# Patient Record
Sex: FEMALE | Race: WHITE | NOT HISPANIC OR LATINO
[De-identification: names, ages, dates, MRNs, and addresses within clinical notes are randomized per-mention and may not be internally consistent; named-entity substitution may affect disease eponyms.]

---

## 2020-10-12 ENCOUNTER — RESULT REVIEW (OUTPATIENT)
Age: 20
End: 2020-10-12

## 2020-11-17 ENCOUNTER — APPOINTMENT (OUTPATIENT)
Dept: ANTEPARTUM | Facility: CLINIC | Age: 20
End: 2020-11-17

## 2021-01-05 ENCOUNTER — APPOINTMENT (OUTPATIENT)
Dept: ANTEPARTUM | Facility: CLINIC | Age: 21
End: 2021-01-05
Payer: MEDICAID

## 2021-01-05 ENCOUNTER — ASOB RESULT (OUTPATIENT)
Age: 21
End: 2021-01-05

## 2021-01-05 PROCEDURE — 99072 ADDL SUPL MATRL&STAF TM PHE: CPT

## 2021-01-05 PROCEDURE — 76811 OB US DETAILED SNGL FETUS: CPT

## 2021-01-05 PROCEDURE — 76817 TRANSVAGINAL US OBSTETRIC: CPT

## 2021-06-08 ENCOUNTER — INPATIENT (INPATIENT)
Facility: HOSPITAL | Age: 21
LOS: 1 days | Discharge: HOME | End: 2021-06-10
Attending: SPECIALIST | Admitting: SPECIALIST

## 2021-06-08 ENCOUNTER — TRANSCRIPTION ENCOUNTER (OUTPATIENT)
Age: 21
End: 2021-06-08

## 2021-06-08 VITALS — TEMPERATURE: 99 F

## 2021-06-08 DIAGNOSIS — O26.899 OTHER SPECIFIED PREGNANCY RELATED CONDITIONS, UNSPECIFIED TRIMESTER: ICD-10-CM

## 2021-06-08 DIAGNOSIS — Z3A.00 WEEKS OF GESTATION OF PREGNANCY NOT SPECIFIED: ICD-10-CM

## 2021-06-08 LAB
BASOPHILS # BLD AUTO: 0.04 K/UL — SIGNIFICANT CHANGE UP (ref 0–0.2)
BASOPHILS NFR BLD AUTO: 0.3 % — SIGNIFICANT CHANGE UP (ref 0–2)
BLD GP AB SCN SERPL QL: NEGATIVE — SIGNIFICANT CHANGE UP
COVID-19 SPIKE DOMAIN AB INTERP: POSITIVE
COVID-19 SPIKE DOMAIN ANTIBODY RESULT: 94.1 U/ML — HIGH
EOSINOPHIL # BLD AUTO: 0.02 K/UL — SIGNIFICANT CHANGE UP (ref 0–0.5)
EOSINOPHIL NFR BLD AUTO: 0.1 % — SIGNIFICANT CHANGE UP (ref 0–6)
HCT VFR BLD CALC: 35.5 % — SIGNIFICANT CHANGE UP (ref 34.5–45)
HGB BLD-MCNC: 12 G/DL — SIGNIFICANT CHANGE UP (ref 11.5–15.5)
IANC: 12.07 K/UL — HIGH (ref 1.5–8.5)
IMM GRANULOCYTES NFR BLD AUTO: 0.5 % — SIGNIFICANT CHANGE UP (ref 0–1.5)
LYMPHOCYTES # BLD AUTO: 1.1 K/UL — SIGNIFICANT CHANGE UP (ref 1–3.3)
LYMPHOCYTES # BLD AUTO: 7.8 % — LOW (ref 13–44)
MCHC RBC-ENTMCNC: 30.6 PG — SIGNIFICANT CHANGE UP (ref 27–34)
MCHC RBC-ENTMCNC: 33.8 GM/DL — SIGNIFICANT CHANGE UP (ref 32–36)
MCV RBC AUTO: 90.6 FL — SIGNIFICANT CHANGE UP (ref 80–100)
MONOCYTES # BLD AUTO: 0.76 K/UL — SIGNIFICANT CHANGE UP (ref 0–0.9)
MONOCYTES NFR BLD AUTO: 5.4 % — SIGNIFICANT CHANGE UP (ref 2–14)
NEUTROPHILS # BLD AUTO: 12.07 K/UL — HIGH (ref 1.8–7.4)
NEUTROPHILS NFR BLD AUTO: 85.9 % — HIGH (ref 43–77)
NRBC # BLD: 0 /100 WBCS — SIGNIFICANT CHANGE UP
NRBC # FLD: 0 K/UL — SIGNIFICANT CHANGE UP
PLATELET # BLD AUTO: 254 K/UL — SIGNIFICANT CHANGE UP (ref 150–400)
RBC # BLD: 3.92 M/UL — SIGNIFICANT CHANGE UP (ref 3.8–5.2)
RBC # FLD: 13.7 % — SIGNIFICANT CHANGE UP (ref 10.3–14.5)
RH IG SCN BLD-IMP: POSITIVE — SIGNIFICANT CHANGE UP
RH IG SCN BLD-IMP: POSITIVE — SIGNIFICANT CHANGE UP
SARS-COV-2 IGG+IGM SERPL QL IA: 94.1 U/ML — HIGH
SARS-COV-2 IGG+IGM SERPL QL IA: POSITIVE
SARS-COV-2 RNA SPEC QL NAA+PROBE: SIGNIFICANT CHANGE UP
T PALLIDUM AB TITR SER: NEGATIVE — SIGNIFICANT CHANGE UP
WBC # BLD: 14.06 K/UL — HIGH (ref 3.8–10.5)
WBC # FLD AUTO: 14.06 K/UL — HIGH (ref 3.8–10.5)

## 2021-06-08 RX ORDER — DIBUCAINE 1 %
1 OINTMENT (GRAM) RECTAL EVERY 6 HOURS
Refills: 0 | Status: DISCONTINUED | OUTPATIENT
Start: 2021-06-08 | End: 2021-06-10

## 2021-06-08 RX ORDER — OXYTOCIN 10 UNIT/ML
333.33 VIAL (ML) INJECTION
Qty: 20 | Refills: 0 | Status: DISCONTINUED | OUTPATIENT
Start: 2021-06-08 | End: 2021-06-09

## 2021-06-08 RX ORDER — SODIUM CHLORIDE 9 MG/ML
300 INJECTION INTRAMUSCULAR; INTRAVENOUS; SUBCUTANEOUS ONCE
Refills: 0 | Status: COMPLETED | OUTPATIENT
Start: 2021-06-08 | End: 2021-06-08

## 2021-06-08 RX ORDER — SODIUM CHLORIDE 9 MG/ML
1000 INJECTION, SOLUTION INTRAVENOUS
Refills: 0 | Status: DISCONTINUED | OUTPATIENT
Start: 2021-06-08 | End: 2021-06-08

## 2021-06-08 RX ORDER — SIMETHICONE 80 MG/1
80 TABLET, CHEWABLE ORAL EVERY 4 HOURS
Refills: 0 | Status: DISCONTINUED | OUTPATIENT
Start: 2021-06-08 | End: 2021-06-10

## 2021-06-08 RX ORDER — AMPICILLIN TRIHYDRATE 250 MG
2 CAPSULE ORAL ONCE
Refills: 0 | Status: COMPLETED | OUTPATIENT
Start: 2021-06-08 | End: 2021-06-08

## 2021-06-08 RX ORDER — OXYCODONE HYDROCHLORIDE 5 MG/1
5 TABLET ORAL
Refills: 0 | Status: DISCONTINUED | OUTPATIENT
Start: 2021-06-08 | End: 2021-06-10

## 2021-06-08 RX ORDER — OXYCODONE HYDROCHLORIDE 5 MG/1
5 TABLET ORAL ONCE
Refills: 0 | Status: DISCONTINUED | OUTPATIENT
Start: 2021-06-08 | End: 2021-06-10

## 2021-06-08 RX ORDER — CITRIC ACID/SODIUM CITRATE 300-500 MG
15 SOLUTION, ORAL ORAL EVERY 6 HOURS
Refills: 0 | Status: DISCONTINUED | OUTPATIENT
Start: 2021-06-08 | End: 2021-06-08

## 2021-06-08 RX ORDER — ACETAMINOPHEN 500 MG
975 TABLET ORAL
Refills: 0 | Status: DISCONTINUED | OUTPATIENT
Start: 2021-06-08 | End: 2021-06-10

## 2021-06-08 RX ORDER — LANOLIN
1 OINTMENT (GRAM) TOPICAL EVERY 6 HOURS
Refills: 0 | Status: DISCONTINUED | OUTPATIENT
Start: 2021-06-08 | End: 2021-06-10

## 2021-06-08 RX ORDER — PRAMOXINE HYDROCHLORIDE 150 MG/15G
1 AEROSOL, FOAM RECTAL EVERY 4 HOURS
Refills: 0 | Status: DISCONTINUED | OUTPATIENT
Start: 2021-06-08 | End: 2021-06-10

## 2021-06-08 RX ORDER — TETANUS TOXOID, REDUCED DIPHTHERIA TOXOID AND ACELLULAR PERTUSSIS VACCINE, ADSORBED 5; 2.5; 8; 8; 2.5 [IU]/.5ML; [IU]/.5ML; UG/.5ML; UG/.5ML; UG/.5ML
0.5 SUSPENSION INTRAMUSCULAR ONCE
Refills: 0 | Status: DISCONTINUED | OUTPATIENT
Start: 2021-06-08 | End: 2021-06-10

## 2021-06-08 RX ORDER — SODIUM CHLORIDE 9 MG/ML
1000 INJECTION INTRAMUSCULAR; INTRAVENOUS; SUBCUTANEOUS
Refills: 0 | Status: DISCONTINUED | OUTPATIENT
Start: 2021-06-08 | End: 2021-06-09

## 2021-06-08 RX ORDER — MAGNESIUM HYDROXIDE 400 MG/1
30 TABLET, CHEWABLE ORAL
Refills: 0 | Status: DISCONTINUED | OUTPATIENT
Start: 2021-06-08 | End: 2021-06-10

## 2021-06-08 RX ORDER — DIPHENHYDRAMINE HCL 50 MG
25 CAPSULE ORAL EVERY 6 HOURS
Refills: 0 | Status: DISCONTINUED | OUTPATIENT
Start: 2021-06-08 | End: 2021-06-10

## 2021-06-08 RX ORDER — AMPICILLIN TRIHYDRATE 250 MG
1 CAPSULE ORAL EVERY 4 HOURS
Refills: 0 | Status: DISCONTINUED | OUTPATIENT
Start: 2021-06-08 | End: 2021-06-08

## 2021-06-08 RX ORDER — IBUPROFEN 200 MG
1 TABLET ORAL
Qty: 0 | Refills: 0 | DISCHARGE
Start: 2021-06-08

## 2021-06-08 RX ORDER — IBUPROFEN 200 MG
600 TABLET ORAL EVERY 6 HOURS
Refills: 0 | Status: COMPLETED | OUTPATIENT
Start: 2021-06-08 | End: 2022-05-07

## 2021-06-08 RX ORDER — ACETAMINOPHEN 500 MG
975 TABLET ORAL ONCE
Refills: 0 | Status: COMPLETED | OUTPATIENT
Start: 2021-06-08 | End: 2021-06-08

## 2021-06-08 RX ORDER — OXYTOCIN 10 UNIT/ML
2 VIAL (ML) INJECTION
Qty: 30 | Refills: 0 | Status: DISCONTINUED | OUTPATIENT
Start: 2021-06-08 | End: 2021-06-09

## 2021-06-08 RX ORDER — SODIUM CHLORIDE 9 MG/ML
3 INJECTION INTRAMUSCULAR; INTRAVENOUS; SUBCUTANEOUS EVERY 8 HOURS
Refills: 0 | Status: DISCONTINUED | OUTPATIENT
Start: 2021-06-08 | End: 2021-06-10

## 2021-06-08 RX ORDER — HYDROCORTISONE 1 %
1 OINTMENT (GRAM) TOPICAL EVERY 6 HOURS
Refills: 0 | Status: DISCONTINUED | OUTPATIENT
Start: 2021-06-08 | End: 2021-06-10

## 2021-06-08 RX ORDER — KETOROLAC TROMETHAMINE 30 MG/ML
30 SYRINGE (ML) INJECTION ONCE
Refills: 0 | Status: DISCONTINUED | OUTPATIENT
Start: 2021-06-08 | End: 2021-06-08

## 2021-06-08 RX ORDER — ACETAMINOPHEN 500 MG
3 TABLET ORAL
Qty: 0 | Refills: 0 | DISCHARGE
Start: 2021-06-08

## 2021-06-08 RX ORDER — BENZOCAINE 10 %
1 GEL (GRAM) MUCOUS MEMBRANE EVERY 6 HOURS
Refills: 0 | Status: DISCONTINUED | OUTPATIENT
Start: 2021-06-08 | End: 2021-06-10

## 2021-06-08 RX ORDER — AER TRAVELER 0.5 G/1
1 SOLUTION RECTAL; TOPICAL EVERY 4 HOURS
Refills: 0 | Status: DISCONTINUED | OUTPATIENT
Start: 2021-06-08 | End: 2021-06-10

## 2021-06-08 RX ORDER — FAMOTIDINE 10 MG/ML
20 INJECTION INTRAVENOUS ONCE
Refills: 0 | Status: COMPLETED | OUTPATIENT
Start: 2021-06-08 | End: 2021-06-08

## 2021-06-08 RX ADMIN — Medication 1000 MILLIUNIT(S)/MIN: at 21:57

## 2021-06-08 RX ADMIN — Medication 216 GRAM(S): at 09:41

## 2021-06-08 RX ADMIN — Medication 108 GRAM(S): at 13:45

## 2021-06-08 RX ADMIN — SODIUM CHLORIDE 600 MILLILITER(S): 9 INJECTION INTRAMUSCULAR; INTRAVENOUS; SUBCUTANEOUS at 13:16

## 2021-06-08 RX ADMIN — Medication 975 MILLIGRAM(S): at 17:36

## 2021-06-08 RX ADMIN — FAMOTIDINE 20 MILLIGRAM(S): 10 INJECTION INTRAVENOUS at 20:24

## 2021-06-08 RX ADMIN — Medication 2 MILLIUNIT(S)/MIN: at 12:32

## 2021-06-08 RX ADMIN — Medication 108 GRAM(S): at 17:38

## 2021-06-08 RX ADMIN — SODIUM CHLORIDE 125 MILLILITER(S): 9 INJECTION INTRAMUSCULAR; INTRAVENOUS; SUBCUTANEOUS at 13:33

## 2021-06-08 RX ADMIN — Medication 30 MILLIGRAM(S): at 22:55

## 2021-06-08 RX ADMIN — SODIUM CHLORIDE 125 MILLILITER(S): 9 INJECTION, SOLUTION INTRAVENOUS at 09:37

## 2021-06-08 NOTE — OB RN DELIVERY SUMMARY - NSSELHIDDEN_OBGYN_ALL_OB_FT
[NS_DeliveryAttending1_OBGYN_ALL_OB_FT:[id number]],[NS_DeliveryAssist1_OBGYN_ALL_OB_FT:[id number]=],[NS_DeliveryRN_OBGYN_ALL_OB_FT:[id number]=]

## 2021-06-08 NOTE — CHART NOTE - NSCHARTNOTEFT_GEN_A_CORE
PA Note    seen & examined for cont of management  comfortable    VS  T(C): 36.6 (06-08-21 @ 11:47)  HR: 106 (06-08-21 @ 13:13)  BP: 112/64 (06-08-21 @ 13:13)  RR: 16 (06-08-21 @ 11:47)  SpO2: 96% (06-08-21 @ 13:13)    /mod jordi/early & variable decels  [place] q 2-3min  4.5/80/-1 IUPC placed    cont efm/toco  pitocin held  amnioinfusion initiated  resuscitative measures in place   dw Dr Campos
PA Note    feeling rectal pressure    VS  T(C): 37.3 (21 @ 15:47)  HR: 79 (21 @ 16:23)  BP: 142/78 (21 @ 16:12)  RR: 16 (21 @ 15:47)  SpO2: 98% (21 @ 16:18)    140/mod jordi/+accels/intermittent early decels   [place] q 2-3min  7/100/0    cont efm/toco  cont current management  Dr Campos present at bedside   anticipated  ldevito pa
PA Note    patient seen & examined for AROM  comfortable with epidural     VS  T(C): 36.6 (06-08-21 @ 10:02)  HR: 66 (06-08-21 @ 11:48)  BP: 124/74 (06-08-21 @ 11:46)  RR: 18 (06-08-21 @ 10:00)  SpO2: 97% (06-08-21 @ 11:43)    /mod jordi/+accels/no decels  [place] q 2-5min  4/80/-2 AROM clear    cont efm/toco  plan for pitocin  dw Dr Kortney caban

## 2021-06-08 NOTE — OB PROVIDER H&P - HISTORY OF PRESENT ILLNESS
19yo  @41w0d  p/w +Ctx every 4-5m. -LOF, -Ctx, +FM. denies fever, chills, nausea, vomiting, diarrhea, headache, constipation, dizziness, syncope, chest pain, palpitations, shortness of breath, dysuria, urgency, frequency.  PNC: unc  GBS: POS  EFW: 3600  ObHx: denies  GynHx: denies  MedHx: denies  SrgHx: denies  PsychHx: denies  SocialHx: denies  AllergyHx: denies  RxHx: denies

## 2021-06-08 NOTE — OB NEONATOLOGY/PEDIATRICIAN DELIVERY SUMMARY - NSPEDSNEONOTESA_OBGYN_ALL_OB_FT
Baby is a 41.0 wk GA male born to a 21 y/o  mother via VAVD. Maternal history uncomplicated. Prenatal history uncomplicated. Maternal BT A+. PNL neg, NR, and immune. GBS positive, was treated with amp x3.  AROM at 11:46 on , clear fluids. Labor complicated by Cat II FHT. Delivery significant for vacuum assist for maternal exhaustion, nuchal x1, and terminal meconium. Baby born vigorous and crying spontaneously. WDSS. Apgars 8/9. At 6mol infant began having deep retractions, flaring and grunting. CPAP was initiated, with max settings of 6/30%. Was able to wean to FiO2 of 21%, put still required pressure support. Patient was admitted to NICU for continued resp support. EOS ***. Mom plans to breastfeed, consents to hepB and declines circ. Mother is COVID negative. Baby is a 41.0 wk GA male born to a 21 y/o  mother via VAVD. Maternal history uncomplicated. Prenatal history uncomplicated. Maternal BT A+. PNL neg, NR, and immune. GBS positive, was treated with amp x3.  AROM at 11:46 on , clear fluids. Labor complicated by Cat II FHT. Delivery significant for vacuum assist for maternal exhaustion, nuchal x1, and terminal meconium. Baby born vigorous and crying spontaneously. WDSS. Apgars 8/9. At 6mol infant began having deep retractions, flaring and grunting. CPAP was initiated, with max settings of 6/30%. Was able to wean to FiO2 of 21%, put still required pressure support. Patient was admitted to NICU for continued resp support. EOS 0.21. Mom plans to breastfeed, consents to hepB and declines circ. Mother is COVID negative.

## 2021-06-08 NOTE — OB PROVIDER H&P - NSHPPHYSICALEXAM_GEN_ALL_CORE
CV:RRR  Pulm: CTA bl  VE: 3/70/-3  FHT:  140   , mod jordi, + accels, - decels RUMA  TOCO:  q3m  BSUS: vertex

## 2021-06-08 NOTE — DISCHARGE NOTE OB - MEDICATION SUMMARY - MEDICATIONS TO TAKE
I will START or STAY ON the medications listed below when I get home from the hospital:    ibuprofen 600 mg oral tablet  -- 1 tab(s) by mouth every 6 hours  -- Indication: For Vaginal delivery    acetaminophen 325 mg oral tablet  -- 3 tab(s) by mouth   -- Indication: For Vaginal delivery

## 2021-06-08 NOTE — DISCHARGE NOTE OB - CARE PLAN
Principal Discharge DX:	Vaginal delivery  Goal:	Normal postpartum course  Assessment and plan of treatment:	Nothing in vagina  Secondary Diagnosis:	No pertinent past medical history

## 2021-06-08 NOTE — OB PROVIDER DELIVERY SUMMARY - NSPROVIDERDELIVERYNOTE_OBGYN_ALL_OB_FT
Delivered per vagina vacuum assisted over rml episiotomy liveborn male haney apgar 8/9.  Peds present for vacuum and tracing, Placenta spontaneously  and delivered intact.  Uterus firm. RML repaired in layers with chromic suture. Infant required CPAP->to NICU for respiratory concerns,

## 2021-06-08 NOTE — OB PROVIDER H&P - ASSESSMENT
21yo  @41w0d p/w ctx not in labor at this time. Pt to be induced for LTIOL  -admit to L&D  - for PO cytotec  - GBS POS for ampcillin  - EFW 3600  - Routine Labs  - FHT/TOCO  - Anesthesia Consult  - Anticipate      dw Dr.Donn Duong Traore PGY2

## 2021-06-08 NOTE — OB PROVIDER DELIVERY SUMMARY - NSSELHIDDEN_OBGYN_ALL_OB_FT
[NS_DeliveryAttending1_OBGYN_ALL_OB_FT:[id number]=],[NS_DeliveryAssist1_OBGYN_ALL_OB_FT:[id number]=]

## 2021-06-08 NOTE — DISCHARGE NOTE OB - CARE PROVIDER_API CALL
Que Sheets)  Obstetrics and Gynecology  [address] [place] [address]  Phone: [phone]  Fax: [phone]  Established Patient  Follow Up Time: Routine

## 2021-06-08 NOTE — OB RN DELIVERY SUMMARY - NS_SEPSISRSKCALC_OBGYN_ALL_OB_FT
EOS calculated successfully. EOS Risk Factor: 0.5/1000 live births ([facility] national incidence); GA=41w;Temp=100.04; ROM=9.733; GBS='Positive'; Antibiotics='GBS specific antibiotics > 2 hrs prior to birth'

## 2021-06-08 NOTE — OB RN TRIAGE NOTE - NS_TRIAGEINITIALRNASSESSMENT_OBGYN_ALL_OB_FT
maintain upright posture during/after eating for 30 mins/oral hygiene/check mouth frequently for oral residue/pocketing/crush medication (when feasible)/no straws/position upright (90 degrees)/small sips/bites lrao

## 2021-06-09 RX ORDER — SENNA PLUS 8.6 MG/1
1 TABLET ORAL
Refills: 0 | Status: DISCONTINUED | OUTPATIENT
Start: 2021-06-09 | End: 2021-06-10

## 2021-06-09 RX ORDER — IBUPROFEN 200 MG
600 TABLET ORAL EVERY 6 HOURS
Refills: 0 | Status: DISCONTINUED | OUTPATIENT
Start: 2021-06-09 | End: 2021-06-10

## 2021-06-09 RX ADMIN — Medication 600 MILLIGRAM(S): at 06:45

## 2021-06-09 RX ADMIN — Medication 600 MILLIGRAM(S): at 06:02

## 2021-06-09 RX ADMIN — Medication 975 MILLIGRAM(S): at 22:39

## 2021-06-09 RX ADMIN — Medication 975 MILLIGRAM(S): at 14:18

## 2021-06-09 RX ADMIN — Medication 975 MILLIGRAM(S): at 14:50

## 2021-06-09 RX ADMIN — MAGNESIUM HYDROXIDE 30 MILLILITER(S): 400 TABLET, CHEWABLE ORAL at 15:05

## 2021-06-09 RX ADMIN — Medication 600 MILLIGRAM(S): at 18:02

## 2021-06-09 RX ADMIN — Medication 600 MILLIGRAM(S): at 18:35

## 2021-06-09 RX ADMIN — Medication 975 MILLIGRAM(S): at 01:07

## 2021-06-09 RX ADMIN — Medication 975 MILLIGRAM(S): at 02:15

## 2021-06-09 RX ADMIN — Medication 975 MILLIGRAM(S): at 23:30

## 2021-06-09 RX ADMIN — Medication 975 MILLIGRAM(S): at 09:21

## 2021-06-09 RX ADMIN — Medication 975 MILLIGRAM(S): at 10:00

## 2021-06-09 RX ADMIN — SODIUM CHLORIDE 3 MILLILITER(S): 9 INJECTION INTRAMUSCULAR; INTRAVENOUS; SUBCUTANEOUS at 06:14

## 2021-06-09 NOTE — PROGRESS NOTE ADULT - SUBJECTIVE AND OBJECTIVE BOX
SUBJECTIVE:    Pain: Controlled    Complaints: None    MILESTONES:     Alert and oriented x 3  [x  ]  Out of bed /ambulating [  x]    Voiding [x  ]  Due to void [  ]    Tolerating a regular diet.    Infant feeding:   Breast [  ]   Bottle [  ]     Both [ X ]                         Feeding related issues or concerns:    Objective   T(C): 36.8 (21 @ 09:12), Max: 37.8 (21 @ 20:28)  HR: 72 (21 @ 09:12) (59 - 165)  BP: 118/83 (21 @ 09:12) (110/63 - 154/71)  RR: 18 (21 @ 09:12) (16 - 18)  SpO2: 99% (21 @ 09:12) (89% - 100%)  Wt(kg): --                        12.0   14.06 )-----------( 254      ( 2021 09:55 )             35.5         Blood Type: A Positive    RPR: Negative          MEDICATIONS  (STANDING):  acetaminophen   Tablet .. 975 milliGRAM(s) Oral <User Schedule>  diphtheria/tetanus/pertussis (acellular) Vaccine (ADAcel) 0.5 milliLiter(s) IntraMuscular once  ibuprofen  Tablet. 600 milliGRAM(s) Oral every 6 hours  prenatal multivitamin 1 Tablet(s) Oral daily  sodium chloride 0.9% lock flush 3 milliLiter(s) IV Push every 8 hours    MEDICATIONS  (PRN):  benzocaine 20%/menthol 0.5% Spray 1 Spray(s) Topical every 6 hours PRN for Perineal discomfort  dibucaine 1% Ointment 1 Application(s) Topical every 6 hours PRN Perineal discomfort  diphenhydrAMINE 25 milliGRAM(s) Oral every 6 hours PRN Pruritus  hydrocortisone 1% Cream 1 Application(s) Topical every 6 hours PRN Moderate Pain (4-6)  lanolin Ointment 1 Application(s) Topical every 6 hours PRN nipple soreness  magnesium hydroxide Suspension 30 milliLiter(s) Oral two times a day PRN Constipation  oxyCODONE    IR 5 milliGRAM(s) Oral every 3 hours PRN Moderate to Severe Pain (4-10)  oxyCODONE    IR 5 milliGRAM(s) Oral once PRN Moderate to Severe Pain (4-10)  pramoxine 1%/zinc 5% Cream 1 Application(s) Topical every 4 hours PRN Moderate Pain (4-6)  simethicone 80 milliGRAM(s) Chew every 4 hours PRN Gas  witch hazel Pads 1 Application(s) Topical every 4 hours PRN Perineal discomfort      ASSESSMENT:      20y      G 1   P 1001       PP Day # 1      Delivery:   [  ]             [   ]      EBL - 300    Assisted delivery  :    Vacuum   [ X ]   Forceps)  [  ]    Indication for assisted delivery: Tracing Abn [  ]     Maternal Exhaustion [  ]     Other [ X ]    Past medical history significant for HPI:  19yo  @41w0d  p/w +Ctx every 4-5m. -LOF, -Ctx, +FM. denies fever, chills, nausea, vomiting, diarrhea, headache, constipation, dizziness, syncope, chest pain, palpitations, shortness of breath, dysuria, urgency, frequency.  PNC: unc  GBS: POS  EFW: 3600  ObHx: denies  GynHx: denies  MedHx: denies  SrgHx: denies  PsychHx: denies  SocialHx: denies  AllergyHx: denies  RxHx: denies  (2021 08:57)      Current Issues:     Breasts: Soft [x  ]    Engorged [  ]  Nipples:  Abdomen: Soft [ x ]  Nontender [  ]  Nondistended [  ]   Distended [  ]    Vagina: Lochia   Mod [x  ]      Scant [  ]  Heavy  [  ]    Perineum:  Intact [  ]   Laceration   [  ]   Type of laceration [          ]    Episiotomy [ X ]    Type of episiotomy  [    RML    ]    Lower extremities: Edema  [  ] noted bilaterally .  Nontender Mc  [ X ]  Negative Fely's sign [  ] Positive pedal pulses [  ]    Other relevant physical exam findings;      PLAN:    Plan: Increase ambulation, analgesia PRN and pain medication  protocol standing oxycodone, ibuprofen and acetaminophen.    Diet: Continue regular diet    Labs:    Continue routine postpartum care.     Discharge Planning [x  ]    For  Discharge Today  [   ]  Consults :  Social Work [  ]     Lactation [x  ]    Other [      ]

## 2021-06-10 VITALS
OXYGEN SATURATION: 99 % | DIASTOLIC BLOOD PRESSURE: 80 MMHG | HEART RATE: 81 BPM | TEMPERATURE: 99 F | RESPIRATION RATE: 18 BRPM | SYSTOLIC BLOOD PRESSURE: 123 MMHG

## 2021-06-10 RX ADMIN — Medication 600 MILLIGRAM(S): at 04:00

## 2021-06-10 RX ADMIN — Medication 975 MILLIGRAM(S): at 10:10

## 2021-06-10 RX ADMIN — Medication 600 MILLIGRAM(S): at 03:04

## 2021-06-10 RX ADMIN — Medication 600 MILLIGRAM(S): at 14:10

## 2021-06-10 RX ADMIN — Medication 600 MILLIGRAM(S): at 13:38

## 2021-06-10 RX ADMIN — Medication 975 MILLIGRAM(S): at 09:39

## 2021-07-08 ENCOUNTER — TRANSCRIPTION ENCOUNTER (OUTPATIENT)
Age: 21
End: 2021-07-08

## 2021-10-06 ENCOUNTER — RESULT REVIEW (OUTPATIENT)
Age: 21
End: 2021-10-06

## 2022-08-16 PROBLEM — Z78.9 OTHER SPECIFIED HEALTH STATUS: Chronic | Status: ACTIVE | Noted: 2021-06-08

## 2022-08-29 PROBLEM — Z00.00 ENCOUNTER FOR PREVENTIVE HEALTH EXAMINATION: Status: ACTIVE | Noted: 2022-08-29

## 2022-08-31 ENCOUNTER — APPOINTMENT (OUTPATIENT)
Dept: SURGERY | Facility: CLINIC | Age: 22
End: 2022-08-31

## 2022-08-31 VITALS
RESPIRATION RATE: 16 BRPM | TEMPERATURE: 98.3 F | WEIGHT: 180 LBS | BODY MASS INDEX: 28.93 KG/M2 | HEART RATE: 101 BPM | SYSTOLIC BLOOD PRESSURE: 136 MMHG | HEIGHT: 66 IN | DIASTOLIC BLOOD PRESSURE: 85 MMHG | OXYGEN SATURATION: 100 %

## 2022-08-31 DIAGNOSIS — Z78.9 OTHER SPECIFIED HEALTH STATUS: ICD-10-CM

## 2022-08-31 DIAGNOSIS — K59.4 ANAL SPASM: ICD-10-CM

## 2022-08-31 DIAGNOSIS — K60.2 ANAL FISSURE, UNSPECIFIED: ICD-10-CM

## 2022-08-31 PROCEDURE — 99203 OFFICE O/P NEW LOW 30 MIN: CPT

## 2022-08-31 RX ORDER — NITROGLYCERIN 20 MG/G
2 OINTMENT TOPICAL
Qty: 1 | Refills: 3 | Status: ACTIVE | COMMUNITY
Start: 2022-08-31 | End: 1900-01-01

## 2022-08-31 NOTE — ASSESSMENT
[FreeTextEntry1] : I have seen and evaluated patient and I have corroborated all nursing input into this note.  Patient's current symptoms appear to be related to her anal fissure.  Her tag is likely a response to the fissure.  I recommended a trial of nitroglycerin and if that is unsuccessful then Botox injections can be performed.  The patient has an open draining wound in the right anterior position adjacent to her medial lateral episiotomy scar.  This may be an infected inclusion cyst.  It does not appear to be a fistula at this time.

## 2022-08-31 NOTE — CONSULT LETTER
Lab called for add on.   [Dear  ___] : Dear ~DIPIKA, [Courtesy Letter:] : I had the pleasure of seeing your patient, [unfilled], in my office today. [Please see my note below.] : Please see my note below. [Consult Closing:] : Thank you very much for allowing me to participate in the care of this patient.  If you have any questions, please do not hesitate to contact me. [Sincerely,] : Sincerely, [FreeTextEntry2] : Dr. Que Sheets [FreeTextEntry3] : Catalino Garcia M.D., ZANE.MOHAN., F.VERENA.S.BRADLEYRJeramyS.\[facility] Chief Colorectal Clinical Services, [facility] [DrJeramy  ___] : Dr. SOLORZANO

## 2022-08-31 NOTE — PHYSICAL EXAM
[Normal Breath Sounds] : Normal breath sounds [Normal Heart Sounds] : normal heart sounds [No Rash or Lesion] : No rash or lesion [Alert] : alert [Oriented to Person] : oriented to person [Oriented to Place] : oriented to place [Oriented to Time] : oriented to time [Calm] : calm [JVD] : no jugular venous distention  [de-identified] : WNL [de-identified] : WNL [de-identified] : ROM WNL [FreeTextEntry1] : Perianal inspection demonstrated a punctate wound in the right anterior position adjacent to a medial lateral episiotomy scar.  There was focal palpable induration.  There was no palpable cord extending to the anus consistent with a fistula.  There was sent anterior tag and associated fissure.  Local tenderness noted on digital exam.  Anoscopy deferred because of pain.

## 2022-08-31 NOTE — HISTORY OF PRESENT ILLNESS
[FreeTextEntry1] : Karla is a 22 year old female here for consultation of rectal bleeding. \par \par Pt never had a colonoscopy. \par \par Today pt reports feeling well, no pain. 4 formed BMs daily, straining, intermittent discomfort, BRB on toilet paper for the last month. Last episode of rectal bleeding 1-2 weeks ago. External tissue that swells and shrinks, itchy. No episodes of incontinence. Good appetite, no nausea, no vomiting. Denies fever and chills. Pt reports using preparation H and Sitz baths with partial relief.

## 2022-10-07 ENCOUNTER — RESULT REVIEW (OUTPATIENT)
Age: 22
End: 2022-10-07

## 2023-07-18 ENCOUNTER — APPOINTMENT (OUTPATIENT)
Dept: ANTEPARTUM | Facility: CLINIC | Age: 23
End: 2023-07-18
Payer: MEDICAID

## 2023-07-18 ENCOUNTER — ASOB RESULT (OUTPATIENT)
Age: 23
End: 2023-07-18

## 2023-07-18 PROCEDURE — 76805 OB US >/= 14 WKS SNGL FETUS: CPT

## 2023-12-03 ENCOUNTER — INPATIENT (INPATIENT)
Facility: HOSPITAL | Age: 23
LOS: 0 days | Discharge: HOME | End: 2023-12-04
Attending: SPECIALIST | Admitting: SPECIALIST

## 2023-12-03 ENCOUNTER — TRANSCRIPTION ENCOUNTER (OUTPATIENT)
Age: 23
End: 2023-12-03

## 2023-12-03 VITALS
SYSTOLIC BLOOD PRESSURE: 114 MMHG | TEMPERATURE: 98 F | HEART RATE: 86 BPM | RESPIRATION RATE: 16 BRPM | DIASTOLIC BLOOD PRESSURE: 74 MMHG

## 2023-12-03 DIAGNOSIS — O26.899 OTHER SPECIFIED PREGNANCY RELATED CONDITIONS, UNSPECIFIED TRIMESTER: ICD-10-CM

## 2023-12-03 LAB
ALBUMIN SERPL ELPH-MCNC: 3.5 G/DL — SIGNIFICANT CHANGE UP (ref 3.3–5)
ALBUMIN SERPL ELPH-MCNC: 3.5 G/DL — SIGNIFICANT CHANGE UP (ref 3.3–5)
ALP SERPL-CCNC: 167 U/L — HIGH (ref 40–120)
ALP SERPL-CCNC: 167 U/L — HIGH (ref 40–120)
ALT FLD-CCNC: 11 U/L — SIGNIFICANT CHANGE UP (ref 4–33)
ALT FLD-CCNC: 11 U/L — SIGNIFICANT CHANGE UP (ref 4–33)
ANION GAP SERPL CALC-SCNC: 13 MMOL/L — SIGNIFICANT CHANGE UP (ref 7–14)
ANION GAP SERPL CALC-SCNC: 13 MMOL/L — SIGNIFICANT CHANGE UP (ref 7–14)
APPEARANCE UR: ABNORMAL
APPEARANCE UR: ABNORMAL
APTT BLD: 26.3 SEC — SIGNIFICANT CHANGE UP (ref 24.5–35.6)
APTT BLD: 26.3 SEC — SIGNIFICANT CHANGE UP (ref 24.5–35.6)
AST SERPL-CCNC: 14 U/L — SIGNIFICANT CHANGE UP (ref 4–32)
AST SERPL-CCNC: 14 U/L — SIGNIFICANT CHANGE UP (ref 4–32)
BACTERIA # UR AUTO: ABNORMAL /HPF
BACTERIA # UR AUTO: ABNORMAL /HPF
BASOPHILS # BLD AUTO: 0.03 K/UL — SIGNIFICANT CHANGE UP (ref 0–0.2)
BASOPHILS # BLD AUTO: 0.03 K/UL — SIGNIFICANT CHANGE UP (ref 0–0.2)
BASOPHILS NFR BLD AUTO: 0.3 % — SIGNIFICANT CHANGE UP (ref 0–2)
BASOPHILS NFR BLD AUTO: 0.3 % — SIGNIFICANT CHANGE UP (ref 0–2)
BILIRUB SERPL-MCNC: <0.2 MG/DL — SIGNIFICANT CHANGE UP (ref 0.2–1.2)
BILIRUB SERPL-MCNC: <0.2 MG/DL — SIGNIFICANT CHANGE UP (ref 0.2–1.2)
BILIRUB UR-MCNC: NEGATIVE — SIGNIFICANT CHANGE UP
BILIRUB UR-MCNC: NEGATIVE — SIGNIFICANT CHANGE UP
BLD GP AB SCN SERPL QL: NEGATIVE — SIGNIFICANT CHANGE UP
BLD GP AB SCN SERPL QL: NEGATIVE — SIGNIFICANT CHANGE UP
BUN SERPL-MCNC: 5 MG/DL — LOW (ref 7–23)
BUN SERPL-MCNC: 5 MG/DL — LOW (ref 7–23)
CALCIUM SERPL-MCNC: 9 MG/DL — SIGNIFICANT CHANGE UP (ref 8.4–10.5)
CALCIUM SERPL-MCNC: 9 MG/DL — SIGNIFICANT CHANGE UP (ref 8.4–10.5)
CAST: 0 /LPF — SIGNIFICANT CHANGE UP (ref 0–4)
CAST: 0 /LPF — SIGNIFICANT CHANGE UP (ref 0–4)
CHLORIDE SERPL-SCNC: 106 MMOL/L — SIGNIFICANT CHANGE UP (ref 98–107)
CHLORIDE SERPL-SCNC: 106 MMOL/L — SIGNIFICANT CHANGE UP (ref 98–107)
CO2 SERPL-SCNC: 20 MMOL/L — LOW (ref 22–31)
CO2 SERPL-SCNC: 20 MMOL/L — LOW (ref 22–31)
COLOR SPEC: YELLOW — SIGNIFICANT CHANGE UP
COLOR SPEC: YELLOW — SIGNIFICANT CHANGE UP
CREAT ?TM UR-MCNC: 35 MG/DL — SIGNIFICANT CHANGE UP
CREAT ?TM UR-MCNC: 35 MG/DL — SIGNIFICANT CHANGE UP
CREAT SERPL-MCNC: 0.5 MG/DL — SIGNIFICANT CHANGE UP (ref 0.5–1.3)
CREAT SERPL-MCNC: 0.5 MG/DL — SIGNIFICANT CHANGE UP (ref 0.5–1.3)
DIFF PNL FLD: NEGATIVE — SIGNIFICANT CHANGE UP
DIFF PNL FLD: NEGATIVE — SIGNIFICANT CHANGE UP
EGFR: 133 ML/MIN/1.73M2 — SIGNIFICANT CHANGE UP
EGFR: 133 ML/MIN/1.73M2 — SIGNIFICANT CHANGE UP
EOSINOPHIL # BLD AUTO: 0.05 K/UL — SIGNIFICANT CHANGE UP (ref 0–0.5)
EOSINOPHIL # BLD AUTO: 0.05 K/UL — SIGNIFICANT CHANGE UP (ref 0–0.5)
EOSINOPHIL NFR BLD AUTO: 0.5 % — SIGNIFICANT CHANGE UP (ref 0–6)
EOSINOPHIL NFR BLD AUTO: 0.5 % — SIGNIFICANT CHANGE UP (ref 0–6)
FIBRINOGEN PPP-MCNC: 421 MG/DL — SIGNIFICANT CHANGE UP (ref 200–465)
FIBRINOGEN PPP-MCNC: 421 MG/DL — SIGNIFICANT CHANGE UP (ref 200–465)
GLUCOSE SERPL-MCNC: 77 MG/DL — SIGNIFICANT CHANGE UP (ref 70–99)
GLUCOSE SERPL-MCNC: 77 MG/DL — SIGNIFICANT CHANGE UP (ref 70–99)
GLUCOSE UR QL: NEGATIVE MG/DL — SIGNIFICANT CHANGE UP
GLUCOSE UR QL: NEGATIVE MG/DL — SIGNIFICANT CHANGE UP
HBV SURFACE AG SERPL QL IA: SIGNIFICANT CHANGE UP
HBV SURFACE AG SERPL QL IA: SIGNIFICANT CHANGE UP
HCT VFR BLD CALC: 36.1 % — SIGNIFICANT CHANGE UP (ref 34.5–45)
HCT VFR BLD CALC: 36.1 % — SIGNIFICANT CHANGE UP (ref 34.5–45)
HGB BLD-MCNC: 12.3 G/DL — SIGNIFICANT CHANGE UP (ref 11.5–15.5)
HGB BLD-MCNC: 12.3 G/DL — SIGNIFICANT CHANGE UP (ref 11.5–15.5)
IANC: 8.16 K/UL — HIGH (ref 1.8–7.4)
IANC: 8.16 K/UL — HIGH (ref 1.8–7.4)
IMM GRANULOCYTES NFR BLD AUTO: 0.6 % — SIGNIFICANT CHANGE UP (ref 0–0.9)
IMM GRANULOCYTES NFR BLD AUTO: 0.6 % — SIGNIFICANT CHANGE UP (ref 0–0.9)
INR BLD: <0.9 RATIO — SIGNIFICANT CHANGE UP (ref 0.85–1.18)
INR BLD: <0.9 RATIO — SIGNIFICANT CHANGE UP (ref 0.85–1.18)
KETONES UR-MCNC: NEGATIVE MG/DL — SIGNIFICANT CHANGE UP
KETONES UR-MCNC: NEGATIVE MG/DL — SIGNIFICANT CHANGE UP
LDH SERPL L TO P-CCNC: 147 U/L — SIGNIFICANT CHANGE UP (ref 135–225)
LDH SERPL L TO P-CCNC: 147 U/L — SIGNIFICANT CHANGE UP (ref 135–225)
LEUKOCYTE ESTERASE UR-ACNC: ABNORMAL
LEUKOCYTE ESTERASE UR-ACNC: ABNORMAL
LYMPHOCYTES # BLD AUTO: 1.49 K/UL — SIGNIFICANT CHANGE UP (ref 1–3.3)
LYMPHOCYTES # BLD AUTO: 1.49 K/UL — SIGNIFICANT CHANGE UP (ref 1–3.3)
LYMPHOCYTES # BLD AUTO: 14.1 % — SIGNIFICANT CHANGE UP (ref 13–44)
LYMPHOCYTES # BLD AUTO: 14.1 % — SIGNIFICANT CHANGE UP (ref 13–44)
MCHC RBC-ENTMCNC: 30.8 PG — SIGNIFICANT CHANGE UP (ref 27–34)
MCHC RBC-ENTMCNC: 30.8 PG — SIGNIFICANT CHANGE UP (ref 27–34)
MCHC RBC-ENTMCNC: 34.1 GM/DL — SIGNIFICANT CHANGE UP (ref 32–36)
MCHC RBC-ENTMCNC: 34.1 GM/DL — SIGNIFICANT CHANGE UP (ref 32–36)
MCV RBC AUTO: 90.3 FL — SIGNIFICANT CHANGE UP (ref 80–100)
MCV RBC AUTO: 90.3 FL — SIGNIFICANT CHANGE UP (ref 80–100)
MONOCYTES # BLD AUTO: 0.75 K/UL — SIGNIFICANT CHANGE UP (ref 0–0.9)
MONOCYTES # BLD AUTO: 0.75 K/UL — SIGNIFICANT CHANGE UP (ref 0–0.9)
MONOCYTES NFR BLD AUTO: 7.1 % — SIGNIFICANT CHANGE UP (ref 2–14)
MONOCYTES NFR BLD AUTO: 7.1 % — SIGNIFICANT CHANGE UP (ref 2–14)
NEUTROPHILS # BLD AUTO: 8.16 K/UL — HIGH (ref 1.8–7.4)
NEUTROPHILS # BLD AUTO: 8.16 K/UL — HIGH (ref 1.8–7.4)
NEUTROPHILS NFR BLD AUTO: 77.4 % — HIGH (ref 43–77)
NEUTROPHILS NFR BLD AUTO: 77.4 % — HIGH (ref 43–77)
NITRITE UR-MCNC: NEGATIVE — SIGNIFICANT CHANGE UP
NITRITE UR-MCNC: NEGATIVE — SIGNIFICANT CHANGE UP
NRBC # BLD: 0 /100 WBCS — SIGNIFICANT CHANGE UP (ref 0–0)
NRBC # BLD: 0 /100 WBCS — SIGNIFICANT CHANGE UP (ref 0–0)
NRBC # FLD: 0 K/UL — SIGNIFICANT CHANGE UP (ref 0–0)
NRBC # FLD: 0 K/UL — SIGNIFICANT CHANGE UP (ref 0–0)
PH UR: 8 — SIGNIFICANT CHANGE UP (ref 5–8)
PH UR: 8 — SIGNIFICANT CHANGE UP (ref 5–8)
PLATELET # BLD AUTO: 253 K/UL — SIGNIFICANT CHANGE UP (ref 150–400)
PLATELET # BLD AUTO: 253 K/UL — SIGNIFICANT CHANGE UP (ref 150–400)
POTASSIUM SERPL-MCNC: 3.8 MMOL/L — SIGNIFICANT CHANGE UP (ref 3.5–5.3)
POTASSIUM SERPL-MCNC: 3.8 MMOL/L — SIGNIFICANT CHANGE UP (ref 3.5–5.3)
POTASSIUM SERPL-SCNC: 3.8 MMOL/L — SIGNIFICANT CHANGE UP (ref 3.5–5.3)
POTASSIUM SERPL-SCNC: 3.8 MMOL/L — SIGNIFICANT CHANGE UP (ref 3.5–5.3)
PROT ?TM UR-MCNC: 6 MG/DL — SIGNIFICANT CHANGE UP
PROT ?TM UR-MCNC: 6 MG/DL — SIGNIFICANT CHANGE UP
PROT SERPL-MCNC: 6.4 G/DL — SIGNIFICANT CHANGE UP (ref 6–8.3)
PROT SERPL-MCNC: 6.4 G/DL — SIGNIFICANT CHANGE UP (ref 6–8.3)
PROT UR-MCNC: NEGATIVE MG/DL — SIGNIFICANT CHANGE UP
PROT UR-MCNC: NEGATIVE MG/DL — SIGNIFICANT CHANGE UP
PROT/CREAT UR-RTO: 0.2 RATIO — SIGNIFICANT CHANGE UP (ref 0–0.2)
PROT/CREAT UR-RTO: 0.2 RATIO — SIGNIFICANT CHANGE UP (ref 0–0.2)
PROTHROM AB SERPL-ACNC: 9.9 SEC — SIGNIFICANT CHANGE UP (ref 9.5–13)
PROTHROM AB SERPL-ACNC: 9.9 SEC — SIGNIFICANT CHANGE UP (ref 9.5–13)
RBC # BLD: 4 M/UL — SIGNIFICANT CHANGE UP (ref 3.8–5.2)
RBC # BLD: 4 M/UL — SIGNIFICANT CHANGE UP (ref 3.8–5.2)
RBC # FLD: 13.2 % — SIGNIFICANT CHANGE UP (ref 10.3–14.5)
RBC # FLD: 13.2 % — SIGNIFICANT CHANGE UP (ref 10.3–14.5)
RBC CASTS # UR COMP ASSIST: 0 /HPF — SIGNIFICANT CHANGE UP (ref 0–4)
RBC CASTS # UR COMP ASSIST: 0 /HPF — SIGNIFICANT CHANGE UP (ref 0–4)
RH IG SCN BLD-IMP: POSITIVE — SIGNIFICANT CHANGE UP
RH IG SCN BLD-IMP: POSITIVE — SIGNIFICANT CHANGE UP
SODIUM SERPL-SCNC: 139 MMOL/L — SIGNIFICANT CHANGE UP (ref 135–145)
SODIUM SERPL-SCNC: 139 MMOL/L — SIGNIFICANT CHANGE UP (ref 135–145)
SP GR SPEC: 1.01 — SIGNIFICANT CHANGE UP (ref 1–1.03)
SP GR SPEC: 1.01 — SIGNIFICANT CHANGE UP (ref 1–1.03)
SQUAMOUS # UR AUTO: 18 /HPF — HIGH (ref 0–5)
SQUAMOUS # UR AUTO: 18 /HPF — HIGH (ref 0–5)
URATE SERPL-MCNC: 3.5 MG/DL — SIGNIFICANT CHANGE UP (ref 2.5–7)
URATE SERPL-MCNC: 3.5 MG/DL — SIGNIFICANT CHANGE UP (ref 2.5–7)
UROBILINOGEN FLD QL: 0.2 MG/DL — SIGNIFICANT CHANGE UP (ref 0.2–1)
UROBILINOGEN FLD QL: 0.2 MG/DL — SIGNIFICANT CHANGE UP (ref 0.2–1)
WBC # BLD: 10.54 K/UL — HIGH (ref 3.8–10.5)
WBC # BLD: 10.54 K/UL — HIGH (ref 3.8–10.5)
WBC # FLD AUTO: 10.54 K/UL — HIGH (ref 3.8–10.5)
WBC # FLD AUTO: 10.54 K/UL — HIGH (ref 3.8–10.5)
WBC UR QL: 6 /HPF — HIGH (ref 0–5)
WBC UR QL: 6 /HPF — HIGH (ref 0–5)

## 2023-12-03 RX ORDER — IBUPROFEN 200 MG
1 TABLET ORAL
Qty: 0 | Refills: 0 | DISCHARGE
Start: 2023-12-03

## 2023-12-03 RX ORDER — OXYCODONE HYDROCHLORIDE 5 MG/1
5 TABLET ORAL ONCE
Refills: 0 | Status: DISCONTINUED | OUTPATIENT
Start: 2023-12-03 | End: 2023-12-04

## 2023-12-03 RX ORDER — CITRIC ACID/SODIUM CITRATE 300-500 MG
15 SOLUTION, ORAL ORAL EVERY 6 HOURS
Refills: 0 | Status: DISCONTINUED | OUTPATIENT
Start: 2023-12-03 | End: 2023-12-03

## 2023-12-03 RX ORDER — DIPHENHYDRAMINE HCL 50 MG
25 CAPSULE ORAL EVERY 6 HOURS
Refills: 0 | Status: DISCONTINUED | OUTPATIENT
Start: 2023-12-03 | End: 2023-12-04

## 2023-12-03 RX ORDER — SIMETHICONE 80 MG/1
80 TABLET, CHEWABLE ORAL EVERY 4 HOURS
Refills: 0 | Status: DISCONTINUED | OUTPATIENT
Start: 2023-12-03 | End: 2023-12-04

## 2023-12-03 RX ORDER — AER TRAVELER 0.5 G/1
1 SOLUTION RECTAL; TOPICAL EVERY 4 HOURS
Refills: 0 | Status: DISCONTINUED | OUTPATIENT
Start: 2023-12-03 | End: 2023-12-04

## 2023-12-03 RX ORDER — HYDROCORTISONE 1 %
1 OINTMENT (GRAM) TOPICAL EVERY 6 HOURS
Refills: 0 | Status: DISCONTINUED | OUTPATIENT
Start: 2023-12-03 | End: 2023-12-04

## 2023-12-03 RX ORDER — OXYTOCIN 10 UNIT/ML
333.33 VIAL (ML) INJECTION
Qty: 20 | Refills: 0 | Status: DISCONTINUED | OUTPATIENT
Start: 2023-12-03 | End: 2023-12-04

## 2023-12-03 RX ORDER — ACETAMINOPHEN 500 MG
3 TABLET ORAL
Qty: 0 | Refills: 0 | DISCHARGE
Start: 2023-12-03

## 2023-12-03 RX ORDER — OXYCODONE HYDROCHLORIDE 5 MG/1
5 TABLET ORAL
Refills: 0 | Status: DISCONTINUED | OUTPATIENT
Start: 2023-12-03 | End: 2023-12-04

## 2023-12-03 RX ORDER — KETOROLAC TROMETHAMINE 30 MG/ML
30 SYRINGE (ML) INJECTION ONCE
Refills: 0 | Status: DISCONTINUED | OUTPATIENT
Start: 2023-12-03 | End: 2023-12-03

## 2023-12-03 RX ORDER — LANOLIN
1 OINTMENT (GRAM) TOPICAL EVERY 6 HOURS
Refills: 0 | Status: DISCONTINUED | OUTPATIENT
Start: 2023-12-03 | End: 2023-12-04

## 2023-12-03 RX ORDER — DIBUCAINE 1 %
1 OINTMENT (GRAM) RECTAL EVERY 6 HOURS
Refills: 0 | Status: DISCONTINUED | OUTPATIENT
Start: 2023-12-03 | End: 2023-12-04

## 2023-12-03 RX ORDER — FAMOTIDINE 10 MG/ML
20 INJECTION INTRAVENOUS ONCE
Refills: 0 | Status: COMPLETED | OUTPATIENT
Start: 2023-12-03 | End: 2023-12-03

## 2023-12-03 RX ORDER — PRAMOXINE HYDROCHLORIDE 150 MG/15G
1 AEROSOL, FOAM RECTAL EVERY 4 HOURS
Refills: 0 | Status: DISCONTINUED | OUTPATIENT
Start: 2023-12-03 | End: 2023-12-04

## 2023-12-03 RX ORDER — SODIUM CHLORIDE 9 MG/ML
3 INJECTION INTRAMUSCULAR; INTRAVENOUS; SUBCUTANEOUS EVERY 8 HOURS
Refills: 0 | Status: DISCONTINUED | OUTPATIENT
Start: 2023-12-03 | End: 2023-12-04

## 2023-12-03 RX ORDER — MAGNESIUM HYDROXIDE 400 MG/1
30 TABLET, CHEWABLE ORAL
Refills: 0 | Status: DISCONTINUED | OUTPATIENT
Start: 2023-12-03 | End: 2023-12-04

## 2023-12-03 RX ORDER — BENZOCAINE 10 %
1 GEL (GRAM) MUCOUS MEMBRANE EVERY 6 HOURS
Refills: 0 | Status: DISCONTINUED | OUTPATIENT
Start: 2023-12-03 | End: 2023-12-04

## 2023-12-03 RX ORDER — IBUPROFEN 200 MG
600 TABLET ORAL EVERY 6 HOURS
Refills: 0 | Status: DISCONTINUED | OUTPATIENT
Start: 2023-12-03 | End: 2023-12-04

## 2023-12-03 RX ORDER — IBUPROFEN 200 MG
600 TABLET ORAL EVERY 6 HOURS
Refills: 0 | Status: COMPLETED | OUTPATIENT
Start: 2023-12-03 | End: 2024-10-31

## 2023-12-03 RX ORDER — INFLUENZA VIRUS VACCINE 15; 15; 15; 15 UG/.5ML; UG/.5ML; UG/.5ML; UG/.5ML
0.5 SUSPENSION INTRAMUSCULAR ONCE
Refills: 0 | Status: DISCONTINUED | OUTPATIENT
Start: 2023-12-03 | End: 2023-12-04

## 2023-12-03 RX ORDER — OXYTOCIN 10 UNIT/ML
4 VIAL (ML) INJECTION
Qty: 30 | Refills: 0 | Status: DISCONTINUED | OUTPATIENT
Start: 2023-12-03 | End: 2023-12-03

## 2023-12-03 RX ORDER — TETANUS TOXOID, REDUCED DIPHTHERIA TOXOID AND ACELLULAR PERTUSSIS VACCINE, ADSORBED 5; 2.5; 8; 8; 2.5 [IU]/.5ML; [IU]/.5ML; UG/.5ML; UG/.5ML; UG/.5ML
0.5 SUSPENSION INTRAMUSCULAR ONCE
Refills: 0 | Status: DISCONTINUED | OUTPATIENT
Start: 2023-12-03 | End: 2023-12-04

## 2023-12-03 RX ORDER — SODIUM CHLORIDE 9 MG/ML
1000 INJECTION, SOLUTION INTRAVENOUS
Refills: 0 | Status: DISCONTINUED | OUTPATIENT
Start: 2023-12-03 | End: 2023-12-03

## 2023-12-03 RX ORDER — ACETAMINOPHEN 500 MG
975 TABLET ORAL ONCE
Refills: 0 | Status: COMPLETED | OUTPATIENT
Start: 2023-12-03 | End: 2023-12-03

## 2023-12-03 RX ORDER — ACETAMINOPHEN 500 MG
975 TABLET ORAL
Refills: 0 | Status: DISCONTINUED | OUTPATIENT
Start: 2023-12-03 | End: 2023-12-04

## 2023-12-03 RX ORDER — CHLORHEXIDINE GLUCONATE 213 G/1000ML
1 SOLUTION TOPICAL DAILY
Refills: 0 | Status: DISCONTINUED | OUTPATIENT
Start: 2023-12-03 | End: 2023-12-03

## 2023-12-03 RX ADMIN — Medication 30 MILLIGRAM(S): at 15:57

## 2023-12-03 RX ADMIN — Medication 975 MILLIGRAM(S): at 15:39

## 2023-12-03 RX ADMIN — Medication 975 MILLIGRAM(S): at 21:35

## 2023-12-03 RX ADMIN — Medication 975 MILLIGRAM(S): at 20:42

## 2023-12-03 RX ADMIN — Medication 4 MILLIUNIT(S)/MIN: at 10:16

## 2023-12-03 RX ADMIN — Medication 600 MILLIGRAM(S): at 23:45

## 2023-12-03 RX ADMIN — SODIUM CHLORIDE 3 MILLILITER(S): 9 INJECTION INTRAMUSCULAR; INTRAVENOUS; SUBCUTANEOUS at 21:05

## 2023-12-03 RX ADMIN — Medication 975 MILLIGRAM(S): at 13:16

## 2023-12-03 RX ADMIN — FAMOTIDINE 20 MILLIGRAM(S): 10 INJECTION INTRAVENOUS at 10:17

## 2023-12-03 RX ADMIN — CHLORHEXIDINE GLUCONATE 1 APPLICATION(S): 213 SOLUTION TOPICAL at 10:27

## 2023-12-03 NOTE — OB PROVIDER H&P - HISTORY OF PRESENT ILLNESS
Ms. YANN GARCIA is a 23y  @ 40w2d presenting for postdates NST/BPP. Feels occasional ctx, mild. + FM. Denies lof, vb. GBS neg (23).    PNC: Denies prenatal issues or complications. Pt reports no hospitalizations, procedures, infections, or new diagnoses in pregnancy. Pt denies complications with blood pressure and/or blood sugar.

## 2023-12-03 NOTE — OB RN DELIVERY SUMMARY - NS_SEPSISRSKCALC_OBGYN_ALL_OB_FT
EOS calculated successfully. EOS Risk Factor: 0.5/1000 live births ([facility] national incidence); GA=40w2d; Temp=99.14; ROM=2.067; GBS='Negative'; Antibiotics='No antibiotics or any antibiotics < 2 hrs prior to birth'   EOS calculated successfully. EOS Risk Factor: 0.5/1000 live births ([facility] national incidence); GA=40w2d; Temp=99.14; ROM=2.067; GBS='Negative'; Antibiotics='No antibiotics or any antibiotics < 2 hrs prior to birth'

## 2023-12-03 NOTE — DISCHARGE NOTE OB - NSCORESITESY/N_GEN_A_CORE_RD
From: Zach Bernstein  To: Evaristo Anna  Sent: 7/17/2023 6:15 PM CDT  Subject: Health Form    Hi Dr Anna,    For my college financial aid to be disbursed for the class I am currently enrolled in; the [employer] has requested that this form be completed. It asks if you deem me healthy enough to function as of our last visit. Thanks in advance.    Zach Bernstein   
No

## 2023-12-03 NOTE — OB PROVIDER DELIVERY SUMMARY - NSSELHIDDEN_OBGYN_ALL_OB_FT
[NS_DeliveryAttending1_OBGYN_ALL_OB_FT:[id number]],[NS_DeliveryAssist1_OBGYN_ALL_OB_FT:[id number]] [NS_DeliveryAttending1_OBGYN_ALL_OB_FT:[id number]],[NS_DeliveryAssist1_OBGYN_ALL_OB_FT:[id number]=]

## 2023-12-03 NOTE — OB RN DELIVERY SUMMARY - AMNIOTIC FLUID ODOR, LABOR
Emiliano Slaomonolz states that she is just trying to be an advocate and feels that Margaux Darby would have less UTIs if she has a jones catheter. We discussed that she needs to meet certain criteria to have a jones placed. She said she just feels she is beyond help now with her UTIs due to her diabetes. She said she previously had a lot of chafing, but does not have that any more. I explained that I would update Dr. Ronaldo Navarrete and recommended scheduling a follow up with Dr. Ronaldo Navarrete to discuss concerns and options. normal

## 2023-12-03 NOTE — DISCHARGE NOTE OB - CARE PROVIDER_API CALL
Que Sheets.  Obstetrics and Gynecology  [address] [place] [address]  Phone: [phone]  Fax: [phone]  Follow Up Time: 1 month   Que Sheets.  Obstetrics and Gynecology  [address] [place] [address]  Phone: [phone]  Fax: [phone]  Follow Up Time: 1 month

## 2023-12-03 NOTE — PROVIDER CONTACT NOTE (OTHER) - ASSESSMENT
pt denies shortness of breath at present time while in bed. pt denied lightheadedness/ dizziness while standing

## 2023-12-03 NOTE — OB PROVIDER TRIAGE NOTE - NSHPPHYSICALEXAM_GEN_ALL_CORE
T(C): --  HR: --  BP: --  RR: --  SpO2: --  General: Female sitting comfortably   Head: Normocephalic. Atraumatic.   Eyes: No discharge, lids normal, conjunctiva normal  Lungs: No resp distress  Abdomen: Soft, nontender. Gravid.   TAUS:  Sono saved in ASOB.   NST:   Neuro: No facial asymmetry, no slurred speech, moves all 4 extremities  Mood: Alert and lucid, appropriate mood and affect

## 2023-12-03 NOTE — OB RN DELIVERY SUMMARY - NSSELHIDDEN_OBGYN_ALL_OB_FT
[NS_DeliveryAttending1_OBGYN_ALL_OB_FT:[id number]],[NS_DeliveryAssist1_OBGYN_ALL_OB_FT:[id number]],[NS_DeliveryRN_OBGYN_ALL_OB_FT:[id number]] [NS_DeliveryAttending1_OBGYN_ALL_OB_FT:[id number]],[NS_DeliveryAssist1_OBGYN_ALL_OB_FT:[id number]=],[NS_DeliveryRN_OBGYN_ALL_OB_FT:[id number]=]

## 2023-12-03 NOTE — CHART NOTE - NSCHARTNOTEFT_GEN_A_CORE
S: Patient seen and evaluated at bedside due to SOB and weakness. Patient reports feeling weak and mild dyspnea when ambulating to bathroom. Patient denies any SOB or weakness when lying in bed. Reports that she is tolerating PO intake. Otherwise denies any lightheadedness, dizziness, chest pain, abdominal pain, N/V.    O:  T(C): 36.7 (03 Dec 2023 18:30), Max: 37.3 (03 Dec 2023 12:15)  T(F): 98 (03 Dec 2023 18:30), Max: 99.14 (03 Dec 2023 12:15)  HR: 88 (03 Dec 2023 17:26) (62 - 144)  BP: 124/79 (03 Dec 2023 17:13) (97/60 - 139/92)  BP(mean): --  ABP: --  ABP(mean): --  RR: 18 (03 Dec 2023 18:30) (16 - 18)  SpO2: 99% (03 Dec 2023 18:30) (93% - 100%)    O2 Parameters below as of 03 Dec 2023 18:30  Patient On (Oxygen Delivery Method): room air               12.3   10.54 )-----------( 253      ( 12-03 @ 09:10 )             36.1     General: No acute distress  Cardiac: Regular rate, rhythm. Normal S1/S2.   Pulmonary: Lungs clear to auscultation bilaterally  Abdomen: Fundus firm, midline, below umbilicus. Appropriate lochia    A/P: Patient PPD#0 from , c/b 2nd degree laceration and . Patient with mild SOB with ambulation and feeling of fatigue. Patient hemodynamically stable, exam unremarkable. SpO2 99%. Patient passed orthostatic vital signs.  - Encourage PO hydration    JERRY Steve, PGY-1 S: Patient seen and evaluated at bedside due to SOB and weakness. Patient reports feeling weak and mild dyspnea when ambulating to bathroom. Patient denies any SOB or weakness when lying in bed. Reports that she is tolerating PO intake. Otherwise denies any lightheadedness, dizziness, chest pain, abdominal pain, N/V.    O:  T(C): 36.7 (03 Dec 2023 18:30), Max: 37.3 (03 Dec 2023 12:15)  T(F): 98 (03 Dec 2023 18:30), Max: 99.14 (03 Dec 2023 12:15)  HR: 88 (03 Dec 2023 17:26) (62 - 144)  BP: 124/79 (03 Dec 2023 17:13) (97/60 - 139/92)  BP(mean): --  ABP: --  ABP(mean): --  RR: 18 (03 Dec 2023 18:30) (16 - 18)  SpO2: 99% (03 Dec 2023 18:30) (93% - 100%)    O2 Parameters below as of 03 Dec 2023 18:30  Patient On (Oxygen Delivery Method): room air               12.3   10.54 )-----------( 253      ( 12-03 @ 09:10 )             36.1     General: No acute distress  Cardiac: Regular rate, rhythm. Normal S1/S2.   Pulmonary: Lungs clear to auscultation bilaterally  Abdomen: Fundus firm, midline, below umbilicus. Appropriate lochia    A/P: Patient PPD#0 from , c/b 2nd degree laceration and . Patient with mild SOB with ambulation and feeling of fatigue. Patient hemodynamically stable, exam unremarkable. SpO2 99%. Patient passed orthostatic vital signs.  - Encourage PO hydration    JRERY Steve, PGY-1

## 2023-12-03 NOTE — OB PROVIDER TRIAGE NOTE - NSOBPROVIDERNOTE_OBGYN_ALL_OB_FT
Ms. YANN GARCIA is a 23y  @ 40w2d presenting for NST/BPP. GBS neg (23).    PNC: Denies prenatal issues or complications. Pt reports no hospitalizations, procedures, infections, or new diagnoses in pregnancy. Pt denies complications with blood pressure and/or blood sugar. Ms. YANN GARCIA is a 23y  @ 40w2d w reassuring NST/BPP.    - 139/92, denies ha, visual disturbances, upper abdominal pain, hx of elevated bps     Plan  - Continue to monitor BP    Plan d/w Dr. Sheets Ms. YANN GARCIA is a 23y  @ 40w2d w reassuring NST/BPP however w newly elevated BP     - 139/92, denies ha, visual disturbances, upper abdominal pain, hx of elevated bps   - Dr. Sheets @ bedside. Notes exam 2-3 / 50 / -3   - BPs from prenatal record reviewed, have been low 100s/70s. Dr. Sheets informed, recommendation for rrIOL given newly increased BPs   - Risks and benefits of iol explained to pt and partner, all questions answered, would like some time to think about  - Pt and partner both confirm that they would like to move forward w iol     Plan  - Admit to Labor and Delivery for rrIOL, >40w, newly elevated BP   - Continuous EFM  - Clear diet, IV fluids  - Consent signed  - Standard labs (T&S, CBC, RPR)  - Induction/augmentation agent: Pitocin 4x2  - Consider re-examination in 4 hours     Informed consent was obtained. The following was discussed:  - Induction/augmentation of labor: use of medication and/or cook balloon to begin or enhance labor  - Obstetrical management including internal fetal/contraction monitoring  - Normal vaginal delivery  - Possible  section    Risks, benefits, alternatives, and possible complications have been discussed in detail with the patient in English, patient's preferred language, demonstrating understanding, all questions answered.. Pre-admission, admission, and post admission procedures and expectations were discussed in detail. All questions answered, all appropriate hospital consents were signed. Anticipate normal vaginal delivery.    Evaluation and plan d/w Dr. Sheets, Dr. Dugan, PGY3 aware.

## 2023-12-03 NOTE — OB RN TRIAGE NOTE - FALL HARM RISK - UNIVERSAL INTERVENTIONS
Bed in lowest position, wheels locked, appropriate side rails in place/Call bell, personal items and telephone in reach/Instruct patient to call for assistance before getting out of bed or chair/Non-slip footwear when patient is out of bed/[place] to call system/Physically safe environment - no spills, clutter or unnecessary equipment/Purposeful Proactive Rounding/Room/bathroom lighting operational, light cord in reach Bed in lowest position, wheels locked, appropriate side rails in place/Call bell, personal items and telephone in reach/Instruct patient to call for assistance before getting out of bed or chair/Non-slip footwear when patient is out of bed/[place] to call system/Physically safe environment - no spills, clutter or unnecessary equipment/Purposeful Proactive Rounding/Room/bathroom lighting operational, light cord in reach Bed in lowest position, wheels locked, appropriate side rails in place/Call bell, personal items and telephone in reach/Instruct patient to call for assistance before getting out of bed or chair/Non-slip footwear when patient is out of bed/[place] to call system/Physically safe environment - no spills, clutter or unnecessary equipment/Purposeful Proactive Rounding/Room/bathroom lighting operational, light cord in reach

## 2023-12-03 NOTE — OB PROVIDER DELIVERY SUMMARY - NSPROVIDERDELIVERYNOTE_OBGYN_ALL_OB_FT
Pt found to be fully dilated with urge to push,  of live born male infant. Head, shoulders and body delivered easily in YEIMI position.  Cord clamping was delayed and then cut uncomplicated. Placenta delivered intact. 2nd degree laceration identified and repaired in usual fashion with chromic suture. R Periurethral laceration noted and repaired with 3-0 vicryl rapide. Bimanual exam performed, with minimal clot evacuated. Fundus and lower uterine segment firm. Good hemostasis.    Tiffany Craig, PGY-1

## 2023-12-03 NOTE — DISCHARGE NOTE OB - DO NOT DRIVE OR OPERATE HEAVY MACHINERY WHEN TAKING NARCOTICS/PRESCRIPTION PAIN MEDICATION THAT CAN CHANGE YOUR MENTAL STATE OR CAUSE DROWSINESS
Island Pedicle Flap With Canthal Suspension Text: The defect edges were debeveled with a #15 scalpel blade.  Given the location of the defect, shape of the defect and the proximity to free margins an island pedicle advancement flap was deemed most appropriate.  Using a sterile surgical marker, an appropriate advancement flap was drawn incorporating the defect, outlining the appropriate donor tissue and placing the expected incisions within the relaxed skin tension lines where possible. The area thus outlined was incised deep to adipose tissue with a #15 scalpel blade.  The skin margins were undermined to an appropriate distance in all directions around the primary defect and laterally outward around the island pedicle utilizing iris scissors.  There was minimal undermining beneath the pedicle flap. A suspension suture was placed in the canthal tendon to prevent tension and prevent ectropion. Statement Selected

## 2023-12-03 NOTE — OB PROVIDER H&P - NSLOWPPHRISK_OBGYN_A_OB
Voice mail left for patient to call RN hotline at [phone].      With an hemoglobin a1c  [ diabetes test ] of 5.3% I want you to stop taking Metformin ( Glucophage) at this point. A follow up hemoglobin a1c  [ diabetes test ] should be checked in roughly 3 months but just the labwork is needed and not necessarily a face to face encounter.     The other area of concern is TSH ( thyroid test ).     The TSH ( thyroid test ) is a bit off. The current dose of thyroid medication is too high.     1. I am sending in a new prescription for the 88 micrograms dose     2. I have placed a laboratory future order for a recheck TSH ( thyroid test ) in 8 weeks     3. Further follow up can be decided upon review of the new TSH ( thyroid test ) and we will make further adjustments as necessary.     Please let me know if you have questions for me     Ac Frederick MD      RN to place orders as detailed above (orders pending)    Jackelin Nguyen RN     No previous uterine incision/[place] Pregnancy/Less than or equal to 4 previous vaginal births/No known bleeding disorder/No history of postpartum hemorrhage/No other PPH risks indicated

## 2023-12-03 NOTE — OB PROVIDER H&P - ASSESSMENT
Ms. YANN GARCIA is a 23y  @ 40w2d w reassuring NST/BPP however w newly elevated BP     - 139/92, denies ha, visual disturbances, upper abdominal pain, hx of elevated bps   - Dr. Sheets @ bedside. Notes exam 2-3 / 50 / -3   - BPs from prenatal record reviewed, have been low 100s/70s. Dr. Sheets informed, recommendation for rrIOL given newly increased BPs   - Risks and benefits of iol explained to pt and partner, all questions answered, would like some time to think about  - Pt and partner both confirm that they would like to move forward w iol     Plan  - Admit to Labor and Delivery for rrIOL, >40w, newly elevated BP   - Continuous EFM  - Clear diet, IV fluids  - Consent signed  - Standard labs (T&S, CBC, RPR)  - Induction/augmentation agent: Pitocin 4x2  - Consider re-examination in 4 hours     Informed consent was obtained. The following was discussed:  - Induction/augmentation of labor: use of medication and/or cook balloon to begin or enhance labor  - Obstetrical management including internal fetal/contraction monitoring  - Normal vaginal delivery  - Possible  section    Risks, benefits, alternatives, and possible complications have been discussed in detail with the patient in English, patient's preferred language, demonstrating understanding, all questions answered.. Pre-admission, admission, and post admission procedures and expectations were discussed in detail. All questions answered, all appropriate hospital consents were signed. Anticipate normal vaginal delivery.    Evaluation and plan d/w Dr. Sheets, Dr. Dugan, PGY3 aware.

## 2023-12-03 NOTE — DISCHARGE NOTE OB - PATIENT PORTAL LINK FT
You can access the FollowMyHealth Patient Portal offered by [facility] by registering at the following website: [email or web address]. By joining [employer]’s FollowMyHealth portal, you will also be able to view your health information using other applications (apps) compatible with our system. You can access the FollowMyHealth Patient Portal offered by [facility] by registering at the following website: [email or web address]. By joining [employer]’s FollowMyHealth portal, you will also be able to view your health information using other applications (apps) compatible with our system.

## 2023-12-03 NOTE — OB RN PATIENT PROFILE - FALL HARM RISK - UNIVERSAL INTERVENTIONS
Bed in lowest position, wheels locked, appropriate side rails in place/Call bell, personal items and telephone in reach/Instruct patient to call for assistance before getting out of bed or chair/Non-slip footwear when patient is out of bed/[place] to call system/Physically safe environment - no spills, clutter or unnecessary equipment/Purposeful Proactive Rounding/Room/bathroom lighting operational, light cord in reach Bed in lowest position, wheels locked, appropriate side rails in place/Call bell, personal items and telephone in reach/Instruct patient to call for assistance before getting out of bed or chair/Non-slip footwear when patient is out of bed/[place] to call system/Physically safe environment - no spills, clutter or unnecessary equipment/Purposeful Proactive Rounding/Room/bathroom lighting operational, light cord in reach Bed in lowest position, wheels locked, appropriate side rails in place/Call bell, personal items and telephone in reach/Instruct patient to call for assistance before getting out of bed or chair/Non-slip footwear when patient is out of bed/[place] to call system/Physically safe environment - no spills, clutter or unnecessary equipment/Purposeful Proactive Rounding/Room/bathroom lighting operational, light cord in reach

## 2023-12-03 NOTE — OB PROVIDER H&P - NSHPPHYSICALEXAM_GEN_ALL_CORE
T(C): --  HR: --  BP: --  RR: --  SpO2: --  General: Female sitting comfortably   Head: Normocephalic. Atraumatic.   Eyes: No discharge, lids normal, conjunctiva normal  Lungs: No resp distress  Abdomen: Soft, nontender. Gravid.   TAUS:  Sono saved in ASOB.   NST: Reactive. Category 1  Neuro: No facial asymmetry, no slurred speech, moves all 4 extremities  Mood: Alert and lucid, appropriate mood and affect

## 2023-12-03 NOTE — DISCHARGE NOTE OB - NS MD DC FALL RISK RISK
For information on Fall & Injury Prevention, visit: [email or web address] OR  [email or web address] OR  https://www.cdc.gov/steadi/patient.html For information on Fall & Injury Prevention, visit: [email or web address] OR  [email or web address] OR  https://www.cdc.gov/steadi/patient.html

## 2023-12-03 NOTE — OB PROVIDER DELIVERY SUMMARY - NSLOWPPHRISK_OBGYN_A_OB
No previous uterine incision/[place] Pregnancy/Less than or equal to 4 previous vaginal births/No known bleeding disorder/No history of postpartum hemorrhage/No other PPH risks indicated

## 2023-12-03 NOTE — PROVIDER CONTACT NOTE (OTHER) - SITUATION
orthostatic BP's done- diastolic BP- laying 73, sitting-74, standing-57. pt c/o shortness of breath while standing no

## 2023-12-03 NOTE — DISCHARGE NOTE OB - MEDICATION SUMMARY - MEDICATIONS TO TAKE
I will START or STAY ON the medications listed below when I get home from the hospital:    ibuprofen 600 mg oral tablet  -- 1 tab(s) by mouth every 6 hours  -- Indication: For Labor and delivery, indication for care    acetaminophen 325 mg oral tablet  -- 3 tab(s) by mouth every 8 hours as needed for  moderate pain  -- Indication: For Labor and delivery, indication for care

## 2023-12-03 NOTE — OB PROVIDER TRIAGE NOTE - HISTORY OF PRESENT ILLNESS
Ms. YANN GARCIA is a 23y  @ 40w2d presenting for NST/BPP. GBS neg (23).    PNC: Denies prenatal issues or complications. Pt reports no hospitalizations, procedures, infections, or new diagnoses in pregnancy. Pt denies complications with blood pressure and/or blood sugar.  Ms. YANN GARCIA is a 23y  @ 40w2d presenting for postdates NST/BPP. Feels occasional ctx, mild. + FM. Denies lof, vb. GBS neg (23).    PNC: Denies prenatal issues or complications. Pt reports no hospitalizations, procedures, infections, or new diagnoses in pregnancy. Pt denies complications with blood pressure and/or blood sugar.

## 2023-12-04 VITALS
RESPIRATION RATE: 18 BRPM | OXYGEN SATURATION: 100 % | DIASTOLIC BLOOD PRESSURE: 82 MMHG | HEART RATE: 80 BPM | TEMPERATURE: 98 F | SYSTOLIC BLOOD PRESSURE: 121 MMHG

## 2023-12-04 LAB
RUBV IGG SER-ACNC: 1.1 INDEX — SIGNIFICANT CHANGE UP
RUBV IGG SER-ACNC: 1.1 INDEX — SIGNIFICANT CHANGE UP
RUBV IGG SER-IMP: POSITIVE — SIGNIFICANT CHANGE UP
RUBV IGG SER-IMP: POSITIVE — SIGNIFICANT CHANGE UP
T PALLIDUM AB TITR SER: NEGATIVE — SIGNIFICANT CHANGE UP
T PALLIDUM AB TITR SER: NEGATIVE — SIGNIFICANT CHANGE UP

## 2023-12-04 RX ADMIN — Medication 600 MILLIGRAM(S): at 06:00

## 2023-12-04 RX ADMIN — Medication 975 MILLIGRAM(S): at 03:06

## 2023-12-04 RX ADMIN — Medication 600 MILLIGRAM(S): at 13:18

## 2023-12-04 RX ADMIN — Medication 600 MILLIGRAM(S): at 00:40

## 2023-12-04 RX ADMIN — Medication 600 MILLIGRAM(S): at 13:45

## 2023-12-04 RX ADMIN — Medication 975 MILLIGRAM(S): at 15:36

## 2023-12-04 RX ADMIN — Medication 600 MILLIGRAM(S): at 05:18

## 2023-12-04 RX ADMIN — SODIUM CHLORIDE 3 MILLILITER(S): 9 INJECTION INTRAMUSCULAR; INTRAVENOUS; SUBCUTANEOUS at 05:43

## 2023-12-04 RX ADMIN — Medication 975 MILLIGRAM(S): at 10:00

## 2023-12-04 RX ADMIN — Medication 975 MILLIGRAM(S): at 08:52

## 2023-12-04 RX ADMIN — Medication 975 MILLIGRAM(S): at 02:29

## 2023-12-04 RX ADMIN — Medication 975 MILLIGRAM(S): at 00:00

## 2023-12-04 NOTE — PROGRESS NOTE ADULT - SUBJECTIVE AND OBJECTIVE BOX
S: Patient doing well. Minimal lochia. Pain controlled.    O: Vital Signs Last 24 Hrs  T(C): 36.6 (04 Dec 2023 05:38), Max: 36.9 (03 Dec 2023 14:16)  T(F): 97.9 (04 Dec 2023 05:38), Max: 98.42 (03 Dec 2023 14:16)  HR: 80 (04 Dec 2023 05:38) (62 - 144)  BP: 121/80 (04 Dec 2023 05:38) (102/59 - 138/87)  BP(mean): --  RR: 18 (04 Dec 2023 05:38) (16 - 18)  SpO2: 99% (04 Dec 2023 05:38) (93% - 100%)    Parameters below as of 03 Dec 2023 21:54  Patient On (Oxygen Delivery Method): room air        Gen: NAD  Abd: soft, NT, ND, fundus firm below umbilicus  Lochia: moderate  Ext: no tenderness    Labs:                        12.3   10.54 )-----------( 253      ( 03 Dec 2023 09:10 )             36.1   Apos    A: 23y PPD#1 s/p  doing well.    Plan:  continue care  discharge instructions given

## 2024-03-17 NOTE — OB RN DELIVERY SUMMARY - NSSTERILIZETYPE_OBGYN_ALL_OB
Started with eye drainage and redness yesterday.    Woke up with left ear pain in middle of the night    Dad denies any other symptoms.    Used old eye drops given a few weeks ago for pink eye  this am.   
None

## 2024-08-12 NOTE — OB PROVIDER H&P - NSPRIMARYCAREPROV_OBGYN_ALL_OB
MD//ELVIRA/ROBERTO
Additional Notes: Father and maternal grandfather with male pattern balding, Father with three series’s of hair transplant surgery.
Render Risk Assessment In Note?: no
Detail Level: Simple

## 2024-10-24 DIAGNOSIS — Z83.3 FAMILY HISTORY OF DIABETES MELLITUS: ICD-10-CM

## 2024-10-24 DIAGNOSIS — Z98.890 OTHER SPECIFIED POSTPROCEDURAL STATES: ICD-10-CM

## 2024-11-06 ENCOUNTER — APPOINTMENT (OUTPATIENT)
Facility: CLINIC | Age: 24
End: 2024-11-06

## 2024-11-06 VITALS — WEIGHT: 181 LBS | DIASTOLIC BLOOD PRESSURE: 70 MMHG | SYSTOLIC BLOOD PRESSURE: 110 MMHG

## 2024-11-06 PROCEDURE — 0502F SUBSEQUENT PRENATAL CARE: CPT

## 2024-11-09 LAB
GLUCOSE UR-MCNC: NORMAL
PROT UR STRIP-MCNC: NORMAL

## 2024-11-12 ENCOUNTER — APPOINTMENT (OUTPATIENT)
Dept: ANTEPARTUM | Facility: CLINIC | Age: 24
End: 2024-11-12
Payer: MEDICAID

## 2024-11-12 ENCOUNTER — ASOB RESULT (OUTPATIENT)
Age: 24
End: 2024-11-12

## 2024-11-12 PROCEDURE — 76805 OB US >/= 14 WKS SNGL FETUS: CPT

## 2024-12-02 ENCOUNTER — LABORATORY RESULT (OUTPATIENT)
Age: 24
End: 2024-12-02

## 2024-12-02 ENCOUNTER — ASOB RESULT (OUTPATIENT)
Age: 24
End: 2024-12-02

## 2024-12-02 ENCOUNTER — APPOINTMENT (OUTPATIENT)
Facility: CLINIC | Age: 24
End: 2024-12-02

## 2024-12-02 PROCEDURE — 0502F SUBSEQUENT PRENATAL CARE: CPT

## 2024-12-05 ENCOUNTER — OUTPATIENT (OUTPATIENT)
Dept: INPATIENT UNIT | Facility: HOSPITAL | Age: 24
LOS: 1 days | Discharge: HOME | End: 2024-12-05
Payer: MEDICAID

## 2024-12-05 ENCOUNTER — APPOINTMENT (OUTPATIENT)
Dept: ANTEPARTUM | Facility: CLINIC | Age: 24
End: 2024-12-05

## 2024-12-05 VITALS
DIASTOLIC BLOOD PRESSURE: 76 MMHG | HEART RATE: 100 BPM | RESPIRATION RATE: 16 BRPM | SYSTOLIC BLOOD PRESSURE: 140 MMHG | TEMPERATURE: 99 F

## 2024-12-05 VITALS — SYSTOLIC BLOOD PRESSURE: 110 MMHG | DIASTOLIC BLOOD PRESSURE: 66 MMHG | HEART RATE: 75 BPM

## 2024-12-05 DIAGNOSIS — O26.899 OTHER SPECIFIED PREGNANCY RELATED CONDITIONS, UNSPECIFIED TRIMESTER: ICD-10-CM

## 2024-12-05 LAB
APPEARANCE UR: ABNORMAL
BACTERIA # UR AUTO: NEGATIVE /HPF — SIGNIFICANT CHANGE UP
BILIRUB UR-MCNC: NEGATIVE — SIGNIFICANT CHANGE UP
CAST: 0 /LPF — SIGNIFICANT CHANGE UP (ref 0–4)
COLOR SPEC: YELLOW — SIGNIFICANT CHANGE UP
DIFF PNL FLD: NEGATIVE — SIGNIFICANT CHANGE UP
GLUCOSE UR QL: NEGATIVE MG/DL — SIGNIFICANT CHANGE UP
KETONES UR-MCNC: NEGATIVE MG/DL — SIGNIFICANT CHANGE UP
LEUKOCYTE ESTERASE UR-ACNC: NEGATIVE — SIGNIFICANT CHANGE UP
NITRITE UR-MCNC: NEGATIVE — SIGNIFICANT CHANGE UP
PH UR: 8 — SIGNIFICANT CHANGE UP (ref 5–8)
PROT UR-MCNC: NEGATIVE MG/DL — SIGNIFICANT CHANGE UP
RBC CASTS # UR COMP ASSIST: 1 /HPF — SIGNIFICANT CHANGE UP (ref 0–4)
SP GR SPEC: 1.02 — SIGNIFICANT CHANGE UP (ref 1–1.03)
SQUAMOUS # UR AUTO: 3 /HPF — SIGNIFICANT CHANGE UP (ref 0–5)
UROBILINOGEN FLD QL: 0.2 MG/DL — SIGNIFICANT CHANGE UP (ref 0.2–1)
WBC UR QL: 0 /HPF — SIGNIFICANT CHANGE UP (ref 0–5)

## 2024-12-05 PROCEDURE — 76815 OB US LIMITED FETUS(S): CPT | Mod: 26

## 2024-12-05 PROCEDURE — ZZZZZ: CPT

## 2024-12-05 PROCEDURE — 59025 FETAL NON-STRESS TEST: CPT | Mod: 26

## 2024-12-05 PROCEDURE — 99221 1ST HOSP IP/OBS SF/LOW 40: CPT | Mod: 25

## 2024-12-05 PROCEDURE — 76817 TRANSVAGINAL US OBSTETRIC: CPT | Mod: 26

## 2024-12-05 NOTE — OB PROVIDER TRIAGE NOTE - NSHPPHYSICALEXAM_GEN_ALL_CORE
T(C): 37.0 (12-05-24 @ 20:23), Max: 37.1 (12-05-24 @ 18:11)  HR: 75 (12-05-24 @ 21:28) (75 - 100)  BP: 110/66 (12-05-24 @ 21:28) (110/66 - 140/76)  RR: 16 (12-05-24 @ 18:11) (16 - 16)  SpO2: --    Physical Exam  Gen: NAD  Neuro: No facial asymmetry, no slurred speech, moves all 4 extremities  Pulm: Unlabored, breathing comfortably on room air  Abdomen: soft, nontender, gravid    TAUS: cephalic, posterior placenta, MVP 7.5,   bpm via m-mode, images saved in ASOB  TVUS: CL 4.67-4.82cm, no funneling   SSE: cervix appears long and closed, no bleeding, no pooling   NST: baseline 150, moderate variability, +10x10 accels, occasional variables. Appropriate for gestational age. Reactive   [employer]: occasional contraction, uterine irritability, toco readjusted after scan and no further contractions

## 2024-12-05 NOTE — OB PROVIDER TRIAGE NOTE - NSHPLABSRESULTS_GEN_ALL_CORE
Urinalysis Basic - ( 05 Dec 2024 21:11 )    Color: Yellow / Appearance: Turbid / S.017 / pH: x  Gluc: x / Ketone: Negative mg/dL  / Bili: Negative / Urobili: 0.2 mg/dL   Blood: x / Protein: Negative mg/dL / Nitrite: Negative   Leuk Esterase: Negative / RBC: 1 /HPF / WBC 0 /HPF   Sq Epi: x / Non Sq Epi: 3 /HPF / Bacteria: Negative /HPF

## 2024-12-05 NOTE — OB PROVIDER TRIAGE NOTE - NSOBPROVIDERNOTE_OBGYN_ALL_OB_FT
This is a 24y  @ 23w 1d female with intermittent lower back/pelvic pain, ruled out for  labor, discharged to home.  UA WNL   CL 4.7-4.8    - Discussed with Dr. Sheets   - Patient to be discharged home with follow up and return precautions  - Advised to try abdominal band for comfort   - Please follow up with your obstetrician at your next scheduled appointment.   - Please return for decreased/no fetal movement, vaginal bleeding similar to that of a period, leaking/gush of fluid, regular contractions occurring 4-5 minutes for one hour or requiring pain medication   - Patient and partner and educated of plan and demonstrate understanding. All questions answered. Discharge instructions provided and signed.   - Discharged at 2140

## 2024-12-05 NOTE — OB PROVIDER TRIAGE NOTE - PLAN OF CARE
- Patient to be discharged home with follow up and return precautions  - Advised to try abdominal band for comfort   - Please follow up with your obstetrician at your next scheduled appointment.   - Please return for decreased/no fetal movement, vaginal bleeding similar to that of a period, leaking/gush of fluid, regular contractions occurring 4-5 minutes for one hour or requiring pain medication   - Patient and partner and educated of plan and demonstrate understanding. All questions answered. Discharge instructions provided and signed.   - Discharged at 2140

## 2024-12-05 NOTE — OB PROVIDER TRIAGE NOTE - CURRENT PREGNANCY COMPLICATIONS, OB PROFILE
[FreeTextEntry1] : 61-year-old female presented for physical exam. Over the past year, the patient has had a concussion when she fell while skiing. She recover from that after a couple of weeks. She feels completely normal at this point. Patient also had a change of antidepressants, the change was brought on by the fact that the previous medication was not available, and she is doing well at this point on the current medication.\par \par Allergic rhinitis, the patient's symptoms are very severe about one month ago, she was coughing excessively. Her medications has been adjusted at the last visit, she is feeling better now. We also discussed different strategy and different combinations of the medication that she can take as needed.\par \par Glucose intolerance, her glycemic control was excellent this year with a hemoglobin A1c of 5.7. She was encouraged to keep up with her effort  with exercise and diet.\par \par Patient has a history of lung nodules, she is still a current smoker. Smoking cessation was discussed with her, she is not quite ready to quit yet. Patient has appointment to follow up with the pulmonologist in a couple of months, she was  scheduled to have PFTs, she will likely need to repeat her chest CT scan as well.\par \par Patient also has a history of  thyroid nodule, her thyroid function tests were normal. She hasn't had a repeat bladder ultrasound for 3 years, I gave her a prescription to repeat the thyroid sonogram, the patient was also advised to follow up with endocrine.\par \par Abnormal urine dipstick, and was sent for a urinalysis and urine culture.\par \par The patient is up-to-date with all of her health care maintenance tests, she was reminded to have routine eye exam, dental care and skin exam with the hematologist.
None

## 2024-12-05 NOTE — OB PROVIDER TRIAGE NOTE - NS_FETALMONCTXRES_OBGYN_ALL_OB
18 yo female previously seeing Dr. Dion Zamora for elevated liver enzymes in pregnancy presents today for further evaluation. A copy of the note will be sent to Dr. Dion Zamora as well as her OBGYN  23/ telemed healow The 23 labs were sent to me.  The white blood cells 9.7, hemoglobin 13, MCV 95, platelets 235 and these are normal.  The fasting blood sugar was 69 which is slightly low.  Recommend sharing this with the primary care and OB/GYN.  Creatinine 0.69, protein slightly low at 5.7 and that may mean you just need to increase your intake but I would recommend sharing this with the primary care and/or OB/GYN.  Bilirubin 0.3, alkaline phosphatase is 143, AST 27, ALT 33.  Goal for the AST and ALT is less than 25.  These are still lower than last time and happy to see this.  The alkaline phosphatase is slightly higher as previously it was 121 and this can be due to pregnancy as this increases later in pregnancy and we can review this at the follow-up.  she had her baby on 23  she had him early, she and he are doing well.  she has peds appt today obgyn in 2 weeks need to get er cleared for mri when ok    recap 23 labs were sent to me.  The glucose 56, and this is low and I suspect you are fasting but you may want to mention this to the OB/GYN.  Creatinine 0.56, sodium 140, potassium 4.1, alkaline phosphatase 121, AST 54, ALT 70, bilirubin 0.4.  The complete blood count was normal other than though neutrophils slightly elevated at 7.3 and upper limits of normal is 7.0 which can be seen inpregnancy but still recommend sharing wt obgyn/mfm Previously on  the alkaline phosphatase 108, AST 69, ALT 96.  The AST and ALT are slightly lower and alp up and this can due to the placenta as this can increase in pregnancy especially close to delivery which she is  She has fu with mfm next week on  She is trying to eat healthier cautioned re blood sugar and share wtih mfm she is 34 weeks pregnant  recap 23 telemed  23 labs The bilirubin 0.4, alkaline phosphatase 108, ast 69, alt 96. This is higher than before and we need to continue to monitor this. The platelets 249.   immunoglobulins normal, tibc 467, iron 82, 18% saturation,ferritin 147, not immune to hbv, asma 25, ama negative, alpha 1 mm, johann negative, rbc 4.18, hgb 12.8, mcv 95, platelets 262, inr 1.0  seems she may have given the lab all of her slips last week so didn't do labs this week. she said she can go today to do them recap She is seeing [place]    23 ov with Dr zamora ast 68 alt 111 tb 0.2. per notes startedin 2023. 30 weeks gestatation. hav negative igm, igg pos.  platlets 247  bile acids normal hcv negative ggt 11, inr 1.0 discussed the prenatal and taking alive and has beets in it an dmaybe try another asked about diet and she is working on this and was eating out a lto so could be affecting this she said the recent labs were lower need to do the weekly labs  recap 23 labs with ast 95 alt 159 tb   2023 us COMPARISON: None.   FINDINGS:    Liver: Normal echogenicity. No lesions. Portal vein is patent. Hepatic veins are patent.   Bile Ducts: No dilated intrahepatic biliary radicles. Common bile duct measures 4 mm.   Gallbladder: Normal. Mercer's sign is negative.   Pancreas: Obscured by overlying bowel gas.   Right Kidney: Measures 12.7 cm. Normal echogenicity. Mild pelvocaliectasis without iliana hydronephrosis.   Other: None.   IMPRESSION:   1. No evidence of acute cholecystitis or cholelithiasis. 2. Mild right pelvocaliectasis without iliana hydronephrosis.
Relaxed

## 2024-12-05 NOTE — OB PROVIDER TRIAGE NOTE - NS_OBGYNHISTORY_OBGYN_ALL_OB_FT
Message sent to patient via [facility]. 2021 , uncomplicated  12/3/2023 , uncomplicated

## 2024-12-05 NOTE — OB PROVIDER TRIAGE NOTE - HISTORY OF PRESENT ILLNESS
This is a 23 y/o  at 23w 1d (EDC 23) who presents complaining of intermittent lower back and lower abdominal pain since Monday. She states when she is driving for a long time or playing with her kids it flares up but when she is resting or when she first wakes up in the morning she feels fine. She states the last time she had an episode was 330pm today. She states when she has the episode it is 9/10 pain and its hard for her to walk and it usually fully passes within 25 min. She states she was seen in the office on Monday for the same complaint and had normal UA/ultrasound. She denies fever, chills, nausea, vomiting, urinary symptoms. Denies leaking of fluid, vaginal bleeding, decreased fetal movement.     PNC with Dr Sheets. AP Course uncomplicated

## 2024-12-08 DIAGNOSIS — M54.50 LOW BACK PAIN, UNSPECIFIED: ICD-10-CM

## 2024-12-08 DIAGNOSIS — Z3A.33 33 WEEKS GESTATION OF PREGNANCY: ICD-10-CM

## 2024-12-08 DIAGNOSIS — O99.891 OTHER SPECIFIED DISEASES AND CONDITIONS COMPLICATING PREGNANCY: ICD-10-CM

## 2024-12-08 DIAGNOSIS — R10.2 PELVIC AND PERINEAL PAIN: ICD-10-CM

## 2024-12-08 DIAGNOSIS — O26.893 OTHER SPECIFIED PREGNANCY RELATED CONDITIONS, THIRD TRIMESTER: ICD-10-CM

## 2024-12-09 ENCOUNTER — APPOINTMENT (OUTPATIENT)
Facility: CLINIC | Age: 24
End: 2024-12-09
Payer: MEDICAID

## 2024-12-09 VITALS — DIASTOLIC BLOOD PRESSURE: 79 MMHG | SYSTOLIC BLOOD PRESSURE: 120 MMHG | WEIGHT: 186 LBS

## 2024-12-09 LAB
GLUCOSE UR-MCNC: NORMAL
PROT UR STRIP-MCNC: NORMAL

## 2024-12-09 PROCEDURE — 0502F SUBSEQUENT PRENATAL CARE: CPT

## 2024-12-12 NOTE — DISCHARGE NOTE OB - REASON FOR REFUSAL (REFER PATIENT TO HEALTHCARE PROVIDER FOR FOLLOW-UP):
"Subjective   Patient ID: Randy Lewis is a 80 y.o. male who presents for Annual Exam.    Here for wellness visit and follow-up  Generally doing well  Sadly his wife's sister passed away.  They were at the  and lunch and this week.  Labs recently completed  Remains active though not exercising much.  He does enjoy his music but not really doing much from a physical standpoint         Review of Systems    Objective   /80 (BP Location: Left arm, Patient Position: Sitting, BP Cuff Size: Adult)   Pulse 60   Ht 1.727 m (5' 8\")   Wt 76 kg (167 lb 9.6 oz)   SpO2 100%   BMI 25.48 kg/m²     Physical Exam  Vitals reviewed.   Constitutional:       Appearance: Normal appearance.   HENT:      Head: Normocephalic and atraumatic.   Eyes:      General: No scleral icterus.        Right eye: No discharge.         Left eye: No discharge.      Extraocular Movements: Extraocular movements intact.      Conjunctiva/sclera: Conjunctivae normal.      Pupils: Pupils are equal, round, and reactive to light.   Cardiovascular:      Rate and Rhythm: Normal rate and regular rhythm.      Pulses: Normal pulses.      Heart sounds: Normal heart sounds. No murmur heard.  Pulmonary:      Effort: Pulmonary effort is normal.      Breath sounds: Normal breath sounds. No wheezing or rhonchi.   Musculoskeletal:         General: No deformity or signs of injury. Normal range of motion.      Cervical back: Normal range of motion and neck supple. No rigidity or tenderness.   Lymphadenopathy:      Cervical: No cervical adenopathy.   Skin:     General: Skin is warm and dry.      Findings: No rash.   Neurological:      General: No focal deficit present.      Mental Status: He is alert and oriented to person, place, and time. Mental status is at baseline.      Cranial Nerves: No cranial nerve deficit.      Sensory: No sensory deficit.      Gait: Gait normal.   Psychiatric:         Mood and Affect: Mood normal.         Behavior: Behavior " normal.      Comments: No abnormal thoughts         Assessment/Plan   Problem List Items Addressed This Visit             ICD-10-CM    Chronic kidney disease, stage 3a (Multi) N18.31    Relevant Orders    Basic Metabolic Panel    Moderate Alzheimer's dementia, unspecified timing of dementia onset, unspecified whether behavioral, psychotic, or mood disturbance or anxiety - Primary G30.9, F02.B0    Dyslipidemia E78.5    Hypothyroidism E03.9    Relevant Orders    TSH with reflex to Free T4 if abnormal    Osteoporosis without current pathological fracture M81.0    Prostate cancer (Multi) C61    Age-related nuclear cataract of both eyes H25.13    Generalized anxiety disorder F41.1    Essential hypertension with goal blood pressure less than 130/85 I10           Portions of this encounter note have been copied from my previous note dated  7/15/24 , which have been updated where appropriate and all reflect my current medical decision making from today.     Care planning-his wife Danitza  was in attendance    Labs rev'd from 11/26/24  DEXA reviewed from 10/24    Living situation-he lives with his wife, since February 2023 at the [facility] in [place], and a senior facility with exercise facilities available as well as various activities with others in different groups with activities there.  [facility] with a basement where he has a room where he enjoys his music.  He no longer drives.  His son Gene lives nearby.    Moderate Alzheimer's-dementia with anxiety and panic attacks-with memory concerns slowly advancing over the last several years.   Neuro psych testing per Dr. Brunson Oct '20 confirmed this. She recommended multiple things, none of which have been done now 5 months out.  At that time, wrote out a list for him to pursue including 1. Neurology consultation 2. Elderly dementia assessment at the [facility]- 3. MRI 4. Occupational driving assessment. After Oct '21 f/u appt w/ Dr. Soto,  Aricept started. His wife feels things are a bit better, in part from his concentrating a bit more, and her humor w/ memory lapses. She avoids correcting him in public. He uses [employer] web site and plays his guitar and sings 2 hrs daily.        8/23-memory loss has progressed somewhat, with behavioral symptoms mainly anxiousness and panic attacks with stressful situations.  They visited with Dr. Soto last week-who suggested low-dose Lexapro along with the hydroxyzine.  We spoke at length regarding the as needed hydroxyzine plan, and considering a 12-week trial of the daily Lexapro to help as a maintenance medicine to help with anxiousness.  Up to this point he has been reluctant to consider the low-dose daily Lexapro but states he will try this for 8 to 12 weeks.  He will continue his exercise routine with swimming daily and time with his music in his basement office room, where he enjoys researching and singing music          2/24-he will continue to take his medication and will see his neurologist each October.  He is no longer driving.  Hydroxyzine refilled but fortunately he has not needed this for anxiousness episodes recently             7/24-doing well presently.  He remains active with an exercise routine.  Scheduled to see Dr. Soto in November and May,  semiannually.  Looking forward towards a [place] cruise with her grandson later this summer.            12/24-remarkably doing fairly well although he has not been doing much from a physical standpoint.  He does enjoy his music, and is able to enjoy family activities along with other friends socially.  Scheduled to follow-up with Dr. Soto in July 2025.  Anxiety fortunately fairly well-controlled presently          Driving concerns- I encouraged him to exchange his 's license at the [facility] for a state ID card.           2/24-he is no longer driving-he did not pass his driving test.  He has a state ID card now.     Hypertension-. Home blood  pressure check has been brought in and is fairly accurate. They will continue to follow and notify me if over 130.              2/24-blood pressure elevated-he will start low-dose losartan and check a BMP in 2 weeks.  His wife will follow his blood pressure in the afternoon, and after dinner and call if the average is not consistently under 140 systolic            7/24-blood pressure improved.  Losartan refilled           12/24-blood pressure acceptable      mild chronic kidney disease-stage IIIA- stable on August 2021 labs-we will continue to follow            12/24-creatinine 1.39-stable from 2 years ago     Dyslipidemia/elevated 10 year cardiac risk assessment- tolerating additional atorvastatin & baby aspirin               2/24-will discontinue the baby aspirin-his wife states that his neurologist had a concern about this            7/24-BMI favorable with 24.6.  Annual lipids ordered.          12/24-LDL 61     Exercise routine-he now has a swimming pool both indoor and outdoor at his [facility] and he swims 1/2-hour daily            Encouraged walking and swimming           7/24-he swims an hour daily at the pool in the complex           12/24-he has not been that active recently-encouraged exercising 30 minutes, 4 days weekly-note written out to optimize compliance     Abnormal EKG- stress echo unremarkable 8/18. No active cardiac symptoms     Carotid bruit concerns/family history of CVA- mother- carotid duplex unremarkable July '19.     Hypothyroidism - he'll cont thyroid supplement & thyroid labs at least semiannually. TSH elevated 1/22. Ordered 8/22.     Medication concerns-I suggested a pill minder and work to have all of his pills taken by lunchtime.     Prostate cancer 2006- we'll continue annual PSA each summer. He no longer sees Dr. Langston. PSA to be followed annually.               No present urology symptoms.               Most recent PSA 11/24  remains undetectable.                Family history  of colon cancer-he will continue a colonoscopy every 3 years    updated per Dr. Martinez April 2019. Last in 4/22. Now on 5 year surveillance - next in 4/27.     Osteopenia with high risk DEXA- he will continue calcium and vitamin D under the direction of his rheumatologist- Fosamax started Oct '21, after Sept '21 DEXA was a bit worse; Doing well each Saturday morning taking this.    transition to Dr. Barnard in 8/22 to determine if he will require shots or continue with Fosamax.             Presently on Prolia-typically scheduled June and December-with infusion, along with blood work.           12/24-Prolia updated last week-he will repeat in 6 months-and see Dr. Barnard in April 2024.  DEXA 10/24-improved     Positive MAIK/Raynaud's- His Raynaud's not problematic this winter.  He will continue with rheumatology care     Left shoulder pain - s/p injury w/ overuse fall '18. improved w/ therapy. no longer on meloxicam. Dr. Douglas helped left shoulder w/ yard work.               Improved     Mid back pain - onset approximately late 7/22 after lifting a [employer]' urn. Currently improved. Encouraged heating and stretching for continued relief. Strongly encouraged caution with such risk-taking behavior     Congestion / epistaxis - Flonase helpful. bleeding mainly a winter issue. encouraged nasal saline, and ENT f/u w/ concerns     Chronic edema/spider veins/stasis pigmentation/Right tibial varicose vein-should add noted distal anterior tibial area. compression hose encouraged w/ travel and extended travel. He will continue salt restriction. He may see Dr. Kelley in Derm surgery. RE options. Asymptomatic        Right ankle eczema / Sun exposure history-he will continue sunscreen especially sailing, a and follow-up with derm. ordered; he uses SPF 50 or 100 on the water. Using moisturizer to right ankle following right ankle treatment recently. Skin Care concerns - encouraged skin care, sun screen when outdoors, and follow up  w/ dermatology w/ any concerning skin changes. He now sees Dr. Salcedo annually     Skin care - he'll cont. sun screen, and see Dr. Barillas annually each March.            7/24-discussed dry skin.  He will change from artery Spring soap to unscented Dove, and use a moisturizer once or twice daily as he swims for an hour daily and the chlorine may be irritating.           12/24-doing well-to see Dr. Barillas 3/25                 Tinnitus - improved w/ his music. He wears ear protection     Early cataracts glasses/family history of cataracts-he will continue annual visits with his eye doctor- Dr. Christiansen annually            12/24-recent visit in 10/24 completed.  Next planned for 10/25    Left lower eyelid irritation-he will continue the fish oil following the doxycycline course, and use compresses.  He appears to have a lower mid lid cyst.  No irritation today.  They will [place] back with the eye doctor as needed     Early cataracts / reading glasses / vision care / retinal freckle - annually exams cont.             They will continue to see Dr. Christiansen each fall     Dental care - semiannually- Dr. Perez - [facility]        Family concerns - daughter in law in [place] had colectomy for colon cancer surgery. in 2020. Doing better           Their son, along w/ his family, visited from [place] earlier this summer.  They hosted the 7 in their new condo.        High dose flu shot encouraged annually after Labor Day-   updated September 2024     Covid series completed 3/21. Covid Booster completed. Additional booster shot updated September 2023    Tdap booster suggested 7/24    RSV vaccination  7/24    Shingrix series completed     Follow-up in 4-5 months, sooner as needed     Charting was completed using voice recognition technology and may include unintended errors.            out patient

## 2024-12-30 ENCOUNTER — NON-APPOINTMENT (OUTPATIENT)
Age: 24
End: 2024-12-30

## 2025-01-06 ENCOUNTER — APPOINTMENT (OUTPATIENT)
Facility: CLINIC | Age: 25
End: 2025-01-06

## 2025-01-06 PROCEDURE — 0502F SUBSEQUENT PRENATAL CARE: CPT

## 2025-01-07 ENCOUNTER — LABORATORY RESULT (OUTPATIENT)
Age: 25
End: 2025-01-07

## 2025-01-09 ENCOUNTER — NON-APPOINTMENT (OUTPATIENT)
Age: 25
End: 2025-01-09

## 2025-01-17 NOTE — OB PROVIDER H&P - LABOR: CERVICAL DILATION
This will be 3rd injection in a 12 month to month basis.     LMTCB 1st attempt-surgical case pended.  Local.     Status of Anticoag  [x] Clearance pending to hold Plavix for 7 days prior to bilateral L4 transforaminal epidural steroid injection.  [] Clearance approved  [] Clearance denied   2-3.9 cm

## 2025-01-27 ENCOUNTER — APPOINTMENT (OUTPATIENT)
Facility: CLINIC | Age: 25
End: 2025-01-27

## 2025-01-27 DIAGNOSIS — Z23 ENCOUNTER FOR IMMUNIZATION: ICD-10-CM

## 2025-01-27 PROCEDURE — 90471 IMMUNIZATION ADMIN: CPT

## 2025-01-27 PROCEDURE — 0502F SUBSEQUENT PRENATAL CARE: CPT

## 2025-01-27 PROCEDURE — 90715 TDAP VACCINE 7 YRS/> IM: CPT

## 2025-02-10 ENCOUNTER — NON-APPOINTMENT (OUTPATIENT)
Age: 25
End: 2025-02-10

## 2025-02-10 ENCOUNTER — APPOINTMENT (OUTPATIENT)
Facility: CLINIC | Age: 25
End: 2025-02-10

## 2025-02-10 PROCEDURE — 76816 OB US FOLLOW-UP PER FETUS: CPT

## 2025-02-10 PROCEDURE — 76819 FETAL BIOPHYS PROFIL W/O NST: CPT

## 2025-02-10 PROCEDURE — 0502F SUBSEQUENT PRENATAL CARE: CPT | Mod: 25

## 2025-02-18 ENCOUNTER — APPOINTMENT (OUTPATIENT)
Dept: ANTEPARTUM | Facility: CLINIC | Age: 25
End: 2025-02-18

## 2025-02-18 ENCOUNTER — ASOB RESULT (OUTPATIENT)
Age: 25
End: 2025-02-18

## 2025-02-18 PROCEDURE — 76819 FETAL BIOPHYS PROFIL W/O NST: CPT | Mod: 59

## 2025-02-18 PROCEDURE — 76816 OB US FOLLOW-UP PER FETUS: CPT

## 2025-02-24 ENCOUNTER — APPOINTMENT (OUTPATIENT)
Facility: CLINIC | Age: 25
End: 2025-02-24

## 2025-02-24 PROCEDURE — 0502F SUBSEQUENT PRENATAL CARE: CPT

## 2025-03-10 ENCOUNTER — APPOINTMENT (OUTPATIENT)
Facility: CLINIC | Age: 25
End: 2025-03-10

## 2025-03-10 PROCEDURE — 0502F SUBSEQUENT PRENATAL CARE: CPT

## 2025-03-11 LAB
C TRACH RRNA SPEC QL NAA+PROBE: NOT DETECTED
N GONORRHOEA RRNA SPEC QL NAA+PROBE: NOT DETECTED
SOURCE AMPLIFICATION: NORMAL

## 2025-03-12 LAB
BACTERIA UR CULT: NORMAL
GP B STREP DNA SPEC QL NAA+PROBE: NOT DETECTED
SOURCE GBS: NORMAL

## 2025-03-17 ENCOUNTER — APPOINTMENT (OUTPATIENT)
Facility: CLINIC | Age: 25
End: 2025-03-17

## 2025-03-17 PROCEDURE — 0502F SUBSEQUENT PRENATAL CARE: CPT

## 2025-03-18 ENCOUNTER — APPOINTMENT (OUTPATIENT)
Dept: ANTEPARTUM | Facility: CLINIC | Age: 25
End: 2025-03-18
Payer: MEDICAID

## 2025-03-18 ENCOUNTER — ASOB RESULT (OUTPATIENT)
Age: 25
End: 2025-03-18

## 2025-03-18 PROCEDURE — 76816 OB US FOLLOW-UP PER FETUS: CPT

## 2025-03-18 PROCEDURE — 76819 FETAL BIOPHYS PROFIL W/O NST: CPT

## 2025-03-24 ENCOUNTER — APPOINTMENT (OUTPATIENT)
Facility: CLINIC | Age: 25
End: 2025-03-24

## 2025-03-24 PROCEDURE — 0502F SUBSEQUENT PRENATAL CARE: CPT

## 2025-03-26 ENCOUNTER — INPATIENT (INPATIENT)
Facility: HOSPITAL | Age: 25
LOS: 0 days | Discharge: HOME | End: 2025-03-27
Attending: SPECIALIST | Admitting: SPECIALIST
Payer: MEDICAID

## 2025-03-26 ENCOUNTER — APPOINTMENT (OUTPATIENT)
Dept: ANTEPARTUM | Facility: CLINIC | Age: 25
End: 2025-03-26

## 2025-03-26 ENCOUNTER — TRANSCRIPTION ENCOUNTER (OUTPATIENT)
Age: 25
End: 2025-03-26

## 2025-03-26 VITALS
DIASTOLIC BLOOD PRESSURE: 62 MMHG | HEART RATE: 98 BPM | OXYGEN SATURATION: 99 % | TEMPERATURE: 97 F | SYSTOLIC BLOOD PRESSURE: 121 MMHG | RESPIRATION RATE: 16 BRPM

## 2025-03-26 DIAGNOSIS — O26.899 OTHER SPECIFIED PREGNANCY RELATED CONDITIONS, UNSPECIFIED TRIMESTER: ICD-10-CM

## 2025-03-26 LAB
BASOPHILS # BLD AUTO: 0.03 K/UL — SIGNIFICANT CHANGE UP (ref 0–0.2)
BASOPHILS NFR BLD AUTO: 0.2 % — SIGNIFICANT CHANGE UP (ref 0–2)
BLD GP AB SCN SERPL QL: NEGATIVE — SIGNIFICANT CHANGE UP
EOSINOPHIL # BLD AUTO: 0.03 K/UL — SIGNIFICANT CHANGE UP (ref 0–0.5)
EOSINOPHIL NFR BLD AUTO: 0.2 % — SIGNIFICANT CHANGE UP (ref 0–6)
HCT VFR BLD CALC: 37.2 % — SIGNIFICANT CHANGE UP (ref 34.5–45)
HGB BLD-MCNC: 12.7 G/DL — SIGNIFICANT CHANGE UP (ref 11.5–15.5)
IANC: 11.17 K/UL — HIGH (ref 1.8–7.4)
IMM GRANULOCYTES NFR BLD AUTO: 0.4 % — SIGNIFICANT CHANGE UP (ref 0–0.9)
LYMPHOCYTES # BLD AUTO: 1.29 K/UL — SIGNIFICANT CHANGE UP (ref 1–3.3)
LYMPHOCYTES # BLD AUTO: 9.6 % — LOW (ref 13–44)
MCHC RBC-ENTMCNC: 31.8 PG — SIGNIFICANT CHANGE UP (ref 27–34)
MCHC RBC-ENTMCNC: 34.1 G/DL — SIGNIFICANT CHANGE UP (ref 32–36)
MCV RBC AUTO: 93 FL — SIGNIFICANT CHANGE UP (ref 80–100)
MONOCYTES # BLD AUTO: 0.83 K/UL — SIGNIFICANT CHANGE UP (ref 0–0.9)
MONOCYTES NFR BLD AUTO: 6.2 % — SIGNIFICANT CHANGE UP (ref 2–14)
NEUTROPHILS # BLD AUTO: 11.17 K/UL — HIGH (ref 1.8–7.4)
NEUTROPHILS NFR BLD AUTO: 83.4 % — HIGH (ref 43–77)
NRBC # BLD AUTO: 0 K/UL — SIGNIFICANT CHANGE UP (ref 0–0)
NRBC # FLD: 0 K/UL — SIGNIFICANT CHANGE UP (ref 0–0)
NRBC BLD AUTO-RTO: 0 /100 WBCS — SIGNIFICANT CHANGE UP (ref 0–0)
PLATELET # BLD AUTO: 206 K/UL — SIGNIFICANT CHANGE UP (ref 150–400)
RBC # BLD: 4 M/UL — SIGNIFICANT CHANGE UP (ref 3.8–5.2)
RBC # FLD: 13.7 % — SIGNIFICANT CHANGE UP (ref 10.3–14.5)
RH IG SCN BLD-IMP: POSITIVE — SIGNIFICANT CHANGE UP
T PALLIDUM AB TITR SER: NEGATIVE — SIGNIFICANT CHANGE UP
WBC # BLD: 13.4 K/UL — HIGH (ref 3.8–10.5)
WBC # FLD AUTO: 13.4 K/UL — HIGH (ref 3.8–10.5)

## 2025-03-26 PROCEDURE — 59400 OBSTETRICAL CARE: CPT | Mod: U9

## 2025-03-26 RX ORDER — SODIUM CHLORIDE 9 G/1000ML
1000 INJECTION, SOLUTION INTRAVENOUS ONCE
Refills: 0 | Status: DISCONTINUED | OUTPATIENT
Start: 2025-03-26 | End: 2025-03-26

## 2025-03-26 RX ORDER — DIBUCAINE 10 MG/G
1 OINTMENT TOPICAL EVERY 6 HOURS
Refills: 0 | Status: DISCONTINUED | OUTPATIENT
Start: 2025-03-26 | End: 2025-03-27

## 2025-03-26 RX ORDER — OXYCODONE HYDROCHLORIDE 30 MG/1
5 TABLET ORAL ONCE
Refills: 0 | Status: DISCONTINUED | OUTPATIENT
Start: 2025-03-26 | End: 2025-03-27

## 2025-03-26 RX ORDER — OXYTOCIN-SODIUM CHLORIDE 0.9% IV SOLN 30 UNIT/500ML 30-0.9/5 UT/ML-%
167 SOLUTION INTRAVENOUS
Qty: 30 | Refills: 0 | Status: DISCONTINUED | OUTPATIENT
Start: 2025-03-26 | End: 2025-03-26

## 2025-03-26 RX ORDER — SODIUM CHLORIDE 9 G/1000ML
1000 INJECTION, SOLUTION INTRAVENOUS
Refills: 0 | Status: DISCONTINUED | OUTPATIENT
Start: 2025-03-26 | End: 2025-03-26

## 2025-03-26 RX ORDER — OXYCODONE HYDROCHLORIDE 30 MG/1
5 TABLET ORAL
Refills: 0 | Status: DISCONTINUED | OUTPATIENT
Start: 2025-03-26 | End: 2025-03-27

## 2025-03-26 RX ORDER — MODIFIED LANOLIN 100 %
1 CREAM (GRAM) TOPICAL EVERY 6 HOURS
Refills: 0 | Status: DISCONTINUED | OUTPATIENT
Start: 2025-03-26 | End: 2025-03-27

## 2025-03-26 RX ORDER — PRENATAL 136/IRON/FOLIC ACID 27 MG-1 MG
1 TABLET ORAL DAILY
Refills: 0 | Status: DISCONTINUED | OUTPATIENT
Start: 2025-03-26 | End: 2025-03-27

## 2025-03-26 RX ORDER — IBUPROFEN 200 MG
600 TABLET ORAL EVERY 6 HOURS
Refills: 0 | Status: COMPLETED | OUTPATIENT
Start: 2025-03-26 | End: 2026-02-22

## 2025-03-26 RX ORDER — CLOSTRIDIUM TETANI TOXOID ANTIGEN (FORMALDEHYDE INACTIVATED), CORYNEBACTERIUM DIPHTHERIAE TOXOID ANTIGEN (FORMALDEHYDE INACTIVATED), BORDETELLA PERTUSSIS TOXOID ANTIGEN (GLUTARALDEHYDE INACTIVATED), BORDETELLA PERTUSSIS FILAMENTOUS HEMAGGLUTININ ANTIGEN (FORMALDEHYDE INACTIVATED), BORDETELLA PERTUSSIS PERTACTIN ANTIGEN, AND BORDETELLA PERTUSSIS FIMBRIAE 2/3 ANTIGEN 5; 2; 2.5; 5; 3; 5 [LF]/.5ML; [LF]/.5ML; UG/.5ML; UG/.5ML; UG/.5ML; UG/.5ML
0.5 INJECTION, SUSPENSION INTRAMUSCULAR ONCE
Refills: 0 | Status: DISCONTINUED | OUTPATIENT
Start: 2025-03-26 | End: 2025-03-27

## 2025-03-26 RX ORDER — IBUPROFEN 200 MG
600 TABLET ORAL EVERY 6 HOURS
Refills: 0 | Status: DISCONTINUED | OUTPATIENT
Start: 2025-03-26 | End: 2025-03-27

## 2025-03-26 RX ORDER — WITCH HAZEL LEAF
1 FLUID EXTRACT MISCELLANEOUS EVERY 4 HOURS
Refills: 0 | Status: DISCONTINUED | OUTPATIENT
Start: 2025-03-26 | End: 2025-03-27

## 2025-03-26 RX ORDER — PRAMOXINE HCL 1 %
1 GEL (GRAM) TOPICAL EVERY 4 HOURS
Refills: 0 | Status: DISCONTINUED | OUTPATIENT
Start: 2025-03-26 | End: 2025-03-27

## 2025-03-26 RX ORDER — KETOROLAC TROMETHAMINE 30 MG/ML
30 INJECTION, SOLUTION INTRAMUSCULAR; INTRAVENOUS ONCE
Refills: 0 | Status: DISCONTINUED | OUTPATIENT
Start: 2025-03-26 | End: 2025-03-26

## 2025-03-26 RX ORDER — MAGNESIUM HYDROXIDE 400 MG/5ML
30 SUSPENSION ORAL
Refills: 0 | Status: DISCONTINUED | OUTPATIENT
Start: 2025-03-26 | End: 2025-03-27

## 2025-03-26 RX ORDER — HYDROCORTISONE 10 MG/G
1 CREAM TOPICAL EVERY 6 HOURS
Refills: 0 | Status: DISCONTINUED | OUTPATIENT
Start: 2025-03-26 | End: 2025-03-27

## 2025-03-26 RX ORDER — SIMETHICONE 80 MG
80 TABLET,CHEWABLE ORAL EVERY 4 HOURS
Refills: 0 | Status: DISCONTINUED | OUTPATIENT
Start: 2025-03-26 | End: 2025-03-27

## 2025-03-26 RX ORDER — DIPHENHYDRAMINE HCL 12.5MG/5ML
25 ELIXIR ORAL EVERY 6 HOURS
Refills: 0 | Status: DISCONTINUED | OUTPATIENT
Start: 2025-03-26 | End: 2025-03-27

## 2025-03-26 RX ORDER — ACETAMINOPHEN 500 MG/5ML
975 LIQUID (ML) ORAL
Refills: 0 | Status: DISCONTINUED | OUTPATIENT
Start: 2025-03-26 | End: 2025-03-27

## 2025-03-26 RX ORDER — BENZOCAINE 220 MG/G
1 SPRAY, METERED PERIODONTAL EVERY 6 HOURS
Refills: 0 | Status: DISCONTINUED | OUTPATIENT
Start: 2025-03-26 | End: 2025-03-27

## 2025-03-26 RX ADMIN — OXYTOCIN-SODIUM CHLORIDE 0.9% IV SOLN 30 UNIT/500ML 167 MILLIUNIT(S)/MIN: 30-0.9/5 SOLUTION at 12:15

## 2025-03-26 RX ADMIN — Medication 975 MILLIGRAM(S): at 18:23

## 2025-03-26 RX ADMIN — HYDROCORTISONE 1 APPLICATION(S): 10 CREAM TOPICAL at 22:32

## 2025-03-26 RX ADMIN — Medication 3 MILLILITER(S): at 14:35

## 2025-03-26 RX ADMIN — Medication 3 MILLILITER(S): at 21:11

## 2025-03-26 RX ADMIN — Medication 975 MILLIGRAM(S): at 19:00

## 2025-03-26 NOTE — OB RN TRIAGE NOTE - GRAVIDA, OB PROFILE
routine vaginal delivery care, no tub baths, douching or sex for 6weeks, ambulate daily  Continue prenatal vitamins for 6 weeks or duration of breastfeeding 3

## 2025-03-26 NOTE — OB RN DELIVERY SUMMARY - NS_GESTAGEATBIRTHA_OBGYN_ALL_OB_FT
Incoming (mail or fax):  Fax  Received from: [facility] [employer]   Documentation given to: Triage    *Prior Authorization needed 39w

## 2025-03-26 NOTE — DISCHARGE NOTE OB - NSDCCPGOAL_GEN_ALL_CORE_FT
Alert and oriented to person, place and time. Normal mood and affect, no apparent risk to self or others
Postpartum Recovery

## 2025-03-26 NOTE — OB PROVIDER H&P - ASSESSMENT
23yo  @ 39.0 admitted for labor  Intrauterine resuscitation  Epidural for pain management  IVLR @ 150cc/hr  Expectant mgnt at this time  Clears diet  D/W Dr. Diamond    Risks, benefits, alternatives, and possible complications have been discussed in detail with the patient in her native language. Pre-admission, admission, and post admission procedures and expectations were discussed in detail. All questions answered, all appropriate hospital consents were signed. Anticipate normal vaginal delivery.    Informed consent was obtained. The following was discussed:    - Induction/augmentation of labor: use of medication and/or cook balloon to begin or enhance labor    - Obstetrical management including internal fetal/contraction monitoring    - Normal vaginal delivery    - Possible  section

## 2025-03-26 NOTE — OB RN PATIENT PROFILE - FALL HARM RISK - UNIVERSAL INTERVENTIONS
Bed in lowest position, wheels locked, appropriate side rails in place/Call bell, personal items and telephone in reach/Instruct patient to call for assistance before getting out of bed or chair/Non-slip footwear when patient is out of bed/[place] to call system/Physically safe environment - no spills, clutter or unnecessary equipment/Purposeful Proactive Rounding/Room/bathroom lighting operational, light cord in reach

## 2025-03-26 NOTE — OB RN PATIENT PROFILE - NS_RSVSEASON_OBGYN_ALL_OB
Called and spoke with mother. They have started the ear drops and patient is no longer complaining of pain. Reviewed medication instructions. No further questions or concerns at this time.    Yes

## 2025-03-26 NOTE — OB PROVIDER H&P - NSHPPHYSICALEXAM_GEN_ALL_CORE
Assessment reveals VSS, abdomen soft, NT, gravid.  VE 5/90/-2- chaperoned by Jessica BRYAN  Vtx confirmed by sono  EFW 8-0 by leopolds Desires epidural.     NST  Baseline  ( 155 ) BPM  Variability (  )  Moderate   (x  ) Minimal  (  ) Absent  (  )  Marked  Accelerations (  ) 15x15   (  ) 10x10  (x ) no  Decelerations (  ) no  (  ) Variable  (  ) Early  ( x ) Late      Description _________  Contractions (  ) no  ( x ) yes  2-3  Interpretation (  ) reactive   ( x )  non-reactive  Cat 2 FHT noted at this time.

## 2025-03-26 NOTE — DISCHARGE NOTE OB - PLAN OF CARE
After discharge, please stay on pelvic rest for 6 weeks, meaning no sexual intercourse, no tampons and no douching.   No lifting objects heavier than baby for 2 weeks.  Expect to have vaginal bleeding/spotting for up to six weeks.  The bleeding should get lighter and more white/light brown with time.  For bleeding soaking more than a pad an hour or passing clots greater than the size of your fist, come in to the   emergency department.  Follow up in the office in 6 weeks

## 2025-03-26 NOTE — OB PROVIDER H&P - LABOR: CERVICAL DILATION
injection 10 mL  10 mL Intravenous 2 times per day Mallika Rivero MD        sodium chloride flush 0.9 % injection 10 mL  10 mL Intravenous PRN Mallika Rivero MD        docusate sodium (COLACE) capsule 100 mg  100 mg Oral BID Mallika Rivero MD        ondansetron [facility]) injection 4 mg  4 mg Intravenous Q6H PRN Mallika Rivero MD        clindamycin (CLEOCIN) 900 mg in dextrose 5% 50 mL IVPB  900 mg Intravenous Tania Pryor MD   Stopped at 07/29/18 0200       Allergies: Allergies   Allergen Reactions    Penicillins        Problem List:    Patient Active Problem List   Diagnosis Code    Cardiac disorder I51.9    Asthma J45.909    PTSD (post-traumatic stress disorder) F43.10    Status post cardiac surgery Z98.890    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 9) E66.9    Major depressive disorder, recurrent, severe without psychotic features ([address] F33.2    Chest pain R07.9    Aortic regurgitation I35.1    Aortic valve insufficiency I35.1    Chest pain R07.9    Acute viral hepatitis B without hepatic coma B16.9    Normal labor O80, Z37.9       Past Medical History:        Diagnosis Date    Acute viral hepatitis B without hepatic coma 6/19/2017    Asthma     Hepatitis B 06/10/2017    blood    Major depressive disorder, recurrent, severe without psychotic features ([address] 3/28/2016    Obesity (BMI 30-39.9) 4/15/2015    Subaortic membrane 4/14/2015       Past Surgical History:        Procedure Laterality Date    ADENOIDECTOMY  2000    AORTIC ARCH REPAIR  2010    CARDIAC SURGERY  2010    LYMPHADENECTOMY  2012    back    TONSILLECTOMY  2000       Social History:    Social History   Substance Use Topics    Smoking status: Never Smoker    Smokeless tobacco: Never Used    Alcohol use Yes      Comment: socially                                Counseling given: Not Answered      Vital Signs (Current):   Vitals:    07/29/18 0401 07/29/18 0503 07/29/18 0601 07/29/18 0713   BP: Reda Bran ) 142/84 (!) 157/84 (!) 147/86 137/81   Pulse: 80 83 82 85   Resp: 18 18 18 18   Temp:  36.8 °C (98.2 °F)  36.8 °C (98.2 °F)   TempSrc:  Oral  Oral   Weight:       Height:                                                  BP Readings from Last 3 Encounters:   07/29/18 137/81   07/27/18 120/70   06/28/18 128/82       NPO Status: Time of last liquid consumption: 1200                        Time of last solid consumption: 2100                        Date of last liquid consumption: 07/28/18                        Date of last solid food consumption: 07/27/18    BMI:   Wt Readings from Last 3 Encounters:   07/28/18 238 lb (108 kg)   07/27/18 238 lb (108 kg)   06/28/18 228 lb (103.4 kg)     Body mass index is 37.28 kg/m². CBC:   Lab Results   Component Value Date    WBC 11.3 07/28/2018    RBC 4.32 07/28/2018    HGB 11.6 07/28/2018    HCT 34.5 07/28/2018    MCV 79.9 07/28/2018    RDW 14.3 07/28/2018     07/28/2018       CMP:   Lab Results   Component Value Date     07/29/2018    K 3.6 07/29/2018     07/29/2018    CO2 19 07/29/2018    BUN 9 07/29/2018    CREATININE <0.5 07/29/2018    GFRAA >60 07/29/2018    AGRATIO 1.1 07/29/2018    LABGLOM >60 07/29/2018    GLUCOSE 62 07/29/2018    PROT 5.7 07/29/2018    CALCIUM 8.2 07/29/2018    BILITOT 0.6 07/29/2018    ALKPHOS 153 07/29/2018    AST 17 07/29/2018    ALT 9 07/29/2018       POC Tests: No results for input(s): POCGLU, POCNA, POCK, POCCL, POCBUN, POCHEMO, POCHCT in the last 72 hours.     Coags:   Lab Results   Component Value Date    PROTIME 13.0 06/19/2017    INR 1.15 06/19/2017       HCG (If Applicable):   Lab Results   Component Value Date    PREGTESTUR Pos 01/18/2018        ABGs: No results found for: PHART, PO2ART, NKE6WCM, VIJ7GUF, BEART, N6RZOLAJ     Type & Screen (If Applicable):  No results found for: LABABO, [address]    Anesthesia Evaluation  Patient summary reviewed and Nursing notes reviewed  Airway: Mallampati: II  TM distance: >3 FB   Neck Greater than or equal to 4 cm

## 2025-03-26 NOTE — OB PROVIDER H&P - NSLOWPPHRISK_OBGYN_A_OB
No previous uterine incision/[place] Pregnancy/Less than or equal to 4 previous vaginal births/No known bleeding disorder/No history of postpartum hemorrhage

## 2025-03-26 NOTE — DISCHARGE NOTE OB - CARE PROVIDER_API CALL
Que Sheets.  Obstetrics and Gynecology  [address] [place] [address]  Phone: [phone]  Fax: [phone]  Follow Up Time:

## 2025-03-26 NOTE — OB NEONATOLOGY/PEDIATRICIAN DELIVERY SUMMARY - NSPEDSNEONOTESA_OBGYN_ALL_OB_FT
NICU Resus called to LDR for Cat II, thick meconium with respiratory distress noted at delivery requiring PPV. 39.0wk LGA female born via NVD to a 25 y/o  mother. No reported significant prenatal or maternal history. Maternal labs include Blood Type A+, HIV - , RPR NR , Rubella I , Hep B - , GBS - 3/10. ROM at 3/26 0915 thick meconium fluids (ROM hours: 0h26m). Baby emerged pale, limp, apneic requiring intermittent PPV over first 1-1.5MOL, transitioned to CPAP max settings 5/100% - (FiO2 rapidly weaned, transported to NICU On 5/30%; deep suctioning retrieved thick mec; Apgars 4/8. Mom plans to initiate breastfeeding and formula feed, consents Hep B vaccine and declines circ.  Highest maternal temp not reported, but RN-reported EOS 0.05.

## 2025-03-26 NOTE — OB RN PATIENT PROFILE - COMFORT/ACCEPTABLE PAIN LEVEL (0-10)
Upon review of the In Basket request we were able to locate, review, and update the patient chart as requested for Mammogram and Pap Smear (HPV) aka Cervical Cancer Screening  Any additional questions or concerns should be emailed to the Practice Liaisons via [email or web address] email, please do not reply via In Basket      Thank you  Yamileth Luna 6

## 2025-03-26 NOTE — DISCHARGE NOTE OB - PATIENT PORTAL LINK FT
You can access the FollowMyHealth Patient Portal offered by [facility] by registering at the following website: [email or web address]. By joining [employer]’s FollowMyHealth portal, you will also be able to view your health information using other applications (apps) compatible with our system.

## 2025-03-26 NOTE — DISCHARGE NOTE OB - FINANCIAL ASSISTANCE
[facility] provides services at a reduced cost to those who are determined to be eligible through [facility]’s financial assistance program. Information regarding [facility]’s financial assistance program can be found by going to [email or web address] or by calling [phone].

## 2025-03-26 NOTE — OB RN DELIVERY SUMMARY - NS_SEPSISRSKCALC_OBGYN_ALL_OB_FT
EOS calculated successfully. EOS Risk Factor: 0.5/1000 live births ([facility] national incidence); GA=39w;Temp=98.42; ROM=0.433; GBS='Negative'; Antibiotics='No antibiotics or any antibiotics < 2 hrs prior to birth'

## 2025-03-26 NOTE — OB PROVIDER H&P - NS_OBGYNHISTORY_OBGYN_ALL_OB_FT
2021 FT  8-4  12/3/2023 FT  7-6    AP course uncomplicated  GBS negative 3/10  Last EFW 8-0, 1 week ago
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
## 2025-03-26 NOTE — DISCHARGE NOTE OB - CARE PLAN
Principal Discharge DX:	Vaginal delivery  Assessment and plan of treatment:	atif   1 Principal Discharge DX:	Vaginal delivery  Assessment and plan of treatment:	After discharge, please stay on pelvic rest for 6 weeks, meaning no sexual intercourse, no tampons and no douching.   No lifting objects heavier than baby for 2 weeks.  Expect to have vaginal bleeding/spotting for up to six weeks.  The bleeding should get lighter and more white/light brown with time.  For bleeding soaking more than a pad an hour or passing clots greater than the size of your fist, come in to the   emergency department.  Follow up in the office in 6 weeks

## 2025-03-26 NOTE — OB RN DELIVERY SUMMARY - NSSELHIDDEN_OBGYN_ALL_OB_FT
[NS_DeliveryAttending1_OBGYN_ALL_OB_FT:[id number]] [NS_DeliveryAttending1_OBGYN_ALL_OB_FT:[id number]],[NS_DeliveryRN_OBGYN_ALL_OB_FT:[id number]]

## 2025-03-26 NOTE — DISCHARGE NOTE OB - MEDICATION SUMMARY - MEDICATIONS TO TAKE
I will START or STAY ON the medications listed below when I get home from the hospital:  None I will START or STAY ON the medications listed below when I get home from the hospital:    ibuprofen 600 mg oral tablet  -- 1 tab(s) by mouth every 6 hours as needed for  moderate pain  -- Indication: For moderate pain    acetaminophen 325 mg oral tablet  -- 3 tab(s) by mouth every 6 hours as needed for  mild pain  -- Indication: For mild pain

## 2025-03-26 NOTE — OB PROVIDER H&P - [email or web address]_ALL_OB
Post-Care Instructions: After the procedure, take precautions agains sun exposure. Do not apply sunscreen for 12 hours after the procedure. Do not apply make-up for 12 hours after the procedure. Avoid alcohol based toners for 10-14 days. After 2-3 days patients can return to their regular skin regimen. Living

## 2025-03-26 NOTE — DISCHARGE NOTE OB - MEDICATION SUMMARY - MEDICATIONS TO STOP TAKING
-- DO NOT REPLY / DO NOT REPLY ALL --  -- Message is from the [facility]--    COVID-19 Universal Screening: N/A - Not about scheduling    General Patient Message      Reason for Call: Patient would like a referral for two past appointments for billing purposes. Patient was unaware that she needed a referral in place for those appointments. She would like a call back as soon as possible.         Caller Information       Type Contact Phone    04/22/2021 04:41 PM CDT Phone (Incoming) Olviia Nguyen (Self) [phone] (H)          Alternative phone number: none         Did the caller agree that this message can wait until the office reopens in the morning? YES - The Message Can Wait      Send a message to the provider’s clinical support pool.     Turnaround time given to caller:   \"This message will be sent to [state Provider's name]. The clinical team will fulfill your request as soon as they review your message when the office opens tomorrow.\"         I will STOP taking the medications listed below when I get home from the hospital:  None

## 2025-03-27 VITALS
SYSTOLIC BLOOD PRESSURE: 117 MMHG | DIASTOLIC BLOOD PRESSURE: 65 MMHG | RESPIRATION RATE: 17 BRPM | HEART RATE: 69 BPM | TEMPERATURE: 98 F | OXYGEN SATURATION: 99 %

## 2025-03-27 LAB
HCT VFR BLD CALC: 33.8 % — LOW (ref 34.5–45)
HGB BLD-MCNC: 11.6 G/DL — SIGNIFICANT CHANGE UP (ref 11.5–15.5)
KLEIHAUER-BETKE CALCULATION: 4.6 % — CRITICAL HIGH (ref 0–0.2)

## 2025-03-27 RX ORDER — IBUPROFEN 200 MG
1 TABLET ORAL
Qty: 0 | Refills: 0 | DISCHARGE
Start: 2025-03-27

## 2025-03-27 RX ORDER — ACETAMINOPHEN 500 MG/5ML
3 LIQUID (ML) ORAL
Qty: 0 | Refills: 0 | DISCHARGE
Start: 2025-03-27

## 2025-03-27 RX ADMIN — Medication 3 MILLILITER(S): at 05:55

## 2025-03-27 RX ADMIN — Medication 975 MILLIGRAM(S): at 08:39

## 2025-03-27 RX ADMIN — Medication 600 MILLIGRAM(S): at 07:12

## 2025-03-27 RX ADMIN — Medication 975 MILLIGRAM(S): at 16:55

## 2025-03-27 RX ADMIN — Medication 975 MILLIGRAM(S): at 16:13

## 2025-03-27 RX ADMIN — Medication 600 MILLIGRAM(S): at 22:18

## 2025-03-27 RX ADMIN — Medication 600 MILLIGRAM(S): at 21:18

## 2025-03-27 RX ADMIN — Medication 600 MILLIGRAM(S): at 06:12

## 2025-03-27 RX ADMIN — Medication 3 MILLILITER(S): at 16:00

## 2025-03-27 RX ADMIN — Medication 3 MILLILITER(S): at 21:08

## 2025-03-27 RX ADMIN — Medication 975 MILLIGRAM(S): at 09:10

## 2025-03-27 RX ADMIN — Medication 600 MILLIGRAM(S): at 00:42

## 2025-03-27 RX ADMIN — Medication 600 MILLIGRAM(S): at 12:30

## 2025-03-27 RX ADMIN — Medication 1 TABLET(S): at 11:57

## 2025-03-27 RX ADMIN — Medication 600 MILLIGRAM(S): at 11:57

## 2025-03-27 RX ADMIN — Medication 600 MILLIGRAM(S): at 01:42

## 2025-03-27 NOTE — PROGRESS NOTE ADULT - SUBJECTIVE AND OBJECTIVE BOX
S: Patient doing well. Minimal lochia. Pain controlled.    O: Vital Signs Last 24 Hrs  T(C): 36.9 (27 Mar 2025 11:48), Max: 37.1 (26 Mar 2025 14:16)  T(F): 98.4 (27 Mar 2025 11:48), Max: 98.8 (26 Mar 2025 14:16)  HR: 80 (27 Mar 2025 11:48) (63 - 89)  BP: 114/62 (27 Mar 2025 11:48) (103/60 - 123/76)  BP(mean): --  RR: 17 (27 Mar 2025 11:48) (16 - 18)  SpO2: 100% (27 Mar 2025 11:48) (99% - 100%)    Parameters below as of 27 Mar 2025 11:48  Patient On (Oxygen Delivery Method): room air        Gen: NAD  Abd: soft, NT, ND, fundus firm below umbilicus  Lochia: moderate  Ext: no tenderness    Labs:                        11.6   x     )-----------( x        ( 27 Mar 2025 05:40 )             33.8       A: 24y PPD#1 s/p  doing well.  Plan: Routine care. DC with instructions.

## 2025-03-31 ENCOUNTER — APPOINTMENT (OUTPATIENT)
Facility: CLINIC | Age: 25
End: 2025-03-31

## 2025-04-04 ENCOUNTER — NON-APPOINTMENT (OUTPATIENT)
Age: 25
End: 2025-04-04

## 2025-04-22 ENCOUNTER — NON-APPOINTMENT (OUTPATIENT)
Age: 25
End: 2025-04-22

## 2025-04-22 DIAGNOSIS — Z98.890 OTHER SPECIFIED POSTPROCEDURAL STATES: ICD-10-CM

## 2025-04-23 ENCOUNTER — APPOINTMENT (OUTPATIENT)
Facility: CLINIC | Age: 25
End: 2025-04-23
Payer: MEDICAID

## 2025-04-23 VITALS — DIASTOLIC BLOOD PRESSURE: 82 MMHG | SYSTOLIC BLOOD PRESSURE: 121 MMHG

## 2025-04-23 PROCEDURE — 99459 PELVIC EXAMINATION: CPT

## 2025-04-23 PROCEDURE — 0503F POSTPARTUM CARE VISIT: CPT

## 2025-04-23 PROCEDURE — 99212 OFFICE O/P EST SF 10 MIN: CPT

## 2025-04-23 PROCEDURE — 96127 BRIEF EMOTIONAL/BEHAV ASSMT: CPT

## 2025-04-23 RX ORDER — DROSPIRENONE AND ETHINYL ESTRADIOL 0.02-3(28)
3-0.02 KIT ORAL DAILY
Qty: 3 | Refills: 1 | Status: ACTIVE | COMMUNITY
Start: 2025-04-23 | End: 1900-01-01

## 2025-06-13 RX ORDER — DROSPIRENONE AND ETHINYL ESTRADIOL 0.02-3(28)
3-0.02 KIT ORAL DAILY
Qty: 1 | Refills: 3 | Status: ACTIVE | COMMUNITY
Start: 2025-06-13 | End: 1900-01-01

## 2025-07-28 ENCOUNTER — APPOINTMENT (OUTPATIENT)
Facility: CLINIC | Age: 25
End: 2025-07-28
Payer: MEDICAID

## 2025-07-28 VITALS — DIASTOLIC BLOOD PRESSURE: 82 MMHG | SYSTOLIC BLOOD PRESSURE: 118 MMHG

## 2025-07-28 DIAGNOSIS — R68.82 DECREASED LIBIDO: ICD-10-CM

## 2025-07-28 DIAGNOSIS — Z01.419 ENCOUNTER FOR GYNECOLOGICAL EXAMINATION (GENERAL) (ROUTINE) W/OUT ABNORMAL FINDINGS: ICD-10-CM

## 2025-07-28 DIAGNOSIS — Z78.9 OTHER SPECIFIED HEALTH STATUS: ICD-10-CM

## 2025-07-28 DIAGNOSIS — Z30.41 ENCOUNTER FOR SURVEILLANCE OF CONTRACEPTIVE PILLS: ICD-10-CM

## 2025-07-28 LAB — HCG UR QL: NEGATIVE

## 2025-07-28 PROCEDURE — 99459 PELVIC EXAMINATION: CPT

## 2025-07-28 PROCEDURE — 99213 OFFICE O/P EST LOW 20 MIN: CPT | Mod: 25

## 2025-07-28 PROCEDURE — 99395 PREV VISIT EST AGE 18-39: CPT

## 2025-07-28 PROCEDURE — G0444 DEPRESSION SCREEN ANNUAL: CPT | Mod: 59

## 2025-07-28 PROCEDURE — 81025 URINE PREGNANCY TEST: CPT

## 2025-07-28 RX ORDER — DROSPIRENONE AND ETHINYL ESTRADIOL 0.03MG-3MG
3-0.03 KIT ORAL
Qty: 3 | Refills: 1 | Status: ACTIVE | COMMUNITY
Start: 2025-07-28 | End: 1900-01-01

## 2025-08-01 LAB — CYTOLOGY CVX/VAG DOC THIN PREP: NORMAL
